# Patient Record
Sex: FEMALE | Race: WHITE | NOT HISPANIC OR LATINO | Employment: FULL TIME | URBAN - NONMETROPOLITAN AREA
[De-identification: names, ages, dates, MRNs, and addresses within clinical notes are randomized per-mention and may not be internally consistent; named-entity substitution may affect disease eponyms.]

---

## 2021-07-08 ENCOUNTER — APPOINTMENT (OUTPATIENT)
Dept: GENERAL RADIOLOGY | Facility: HOSPITAL | Age: 46
End: 2021-07-08

## 2021-07-08 ENCOUNTER — HOSPITAL ENCOUNTER (EMERGENCY)
Facility: HOSPITAL | Age: 46
Discharge: HOME | End: 2021-07-08
Admitting: EMERGENCY MEDICINE

## 2021-07-08 VITALS
HEIGHT: 64 IN | HEART RATE: 86 BPM | DIASTOLIC BLOOD PRESSURE: 88 MMHG | TEMPERATURE: 99.3 F | OXYGEN SATURATION: 96 % | SYSTOLIC BLOOD PRESSURE: 134 MMHG | BODY MASS INDEX: 47.12 KG/M2 | RESPIRATION RATE: 16 BRPM | WEIGHT: 276 LBS

## 2021-07-08 DIAGNOSIS — R74.8 ELEVATED LIVER ENZYMES: ICD-10-CM

## 2021-07-08 DIAGNOSIS — N39.0 URINARY TRACT INFECTION WITHOUT HEMATURIA, SITE UNSPECIFIED: ICD-10-CM

## 2021-07-08 DIAGNOSIS — U07.1 COVID-19: Primary | ICD-10-CM

## 2021-07-08 LAB
ALBUMIN SERPL-MCNC: 3.5 G/DL (ref 3.5–5.2)
ALBUMIN/GLOB SERPL: 1 G/DL
ALP SERPL-CCNC: 108 U/L (ref 39–117)
ALT SERPL W P-5'-P-CCNC: 161 U/L (ref 1–33)
ANION GAP SERPL CALCULATED.3IONS-SCNC: 11 MMOL/L (ref 5–15)
ANISOCYTOSIS BLD QL: ABNORMAL
AST SERPL-CCNC: 122 U/L (ref 1–32)
B-HCG UR QL: NEGATIVE
BACTERIA UR QL AUTO: ABNORMAL /HPF
BASOPHILS # BLD MANUAL: 0.03 10*3/MM3 (ref 0–0.2)
BASOPHILS NFR BLD AUTO: 1 % (ref 0–1.5)
BILIRUB SERPL-MCNC: 0.9 MG/DL (ref 0–1.2)
BILIRUB UR QL STRIP: NEGATIVE
BUN SERPL-MCNC: 7 MG/DL (ref 6–20)
BUN/CREAT SERPL: 7.5 (ref 7–25)
CALCIUM SPEC-SCNC: 8.3 MG/DL (ref 8.6–10.5)
CHLORIDE SERPL-SCNC: 92 MMOL/L (ref 98–107)
CLARITY UR: CLEAR
CO2 SERPL-SCNC: 27 MMOL/L (ref 22–29)
COLOR UR: ABNORMAL
CREAT SERPL-MCNC: 0.93 MG/DL (ref 0.57–1)
DEPRECATED RDW RBC AUTO: 40.6 FL (ref 37–54)
ERYTHROCYTE [DISTWIDTH] IN BLOOD BY AUTOMATED COUNT: 13.2 % (ref 12.3–15.4)
GFR SERPL CREATININE-BSD FRML MDRD: 65 ML/MIN/1.73
GIANT PLATELETS: ABNORMAL
GLOBULIN UR ELPH-MCNC: 3.6 GM/DL
GLUCOSE SERPL-MCNC: 115 MG/DL (ref 65–99)
GLUCOSE UR STRIP-MCNC: NEGATIVE MG/DL
HCT VFR BLD AUTO: 41.1 % (ref 34–46.6)
HGB BLD-MCNC: 14.9 G/DL (ref 12–15.9)
HGB UR QL STRIP.AUTO: NEGATIVE
HYALINE CASTS UR QL AUTO: ABNORMAL /LPF
KETONES UR QL STRIP: ABNORMAL
LEUKOCYTE ESTERASE UR QL STRIP.AUTO: ABNORMAL
LYMPHOCYTES # BLD MANUAL: 0.45 10*3/MM3 (ref 0.7–3.1)
LYMPHOCYTES NFR BLD MANUAL: 12.2 % (ref 19.6–45.3)
LYMPHOCYTES NFR BLD MANUAL: 2 % (ref 5–12)
MCH RBC QN AUTO: 30.5 PG (ref 26.6–33)
MCHC RBC AUTO-ENTMCNC: 36.3 G/DL (ref 31.5–35.7)
MCV RBC AUTO: 84 FL (ref 79–97)
MICROCYTES BLD QL: ABNORMAL
MONOCYTES # BLD AUTO: 0.06 10*3/MM3 (ref 0.1–0.9)
NEUTROPHILS # BLD AUTO: 2.39 10*3/MM3 (ref 1.7–7)
NEUTROPHILS NFR BLD MANUAL: 80.6 % (ref 42.7–76)
NEUTS BAND NFR BLD MANUAL: 1 % (ref 0–5)
NITRITE UR QL STRIP: NEGATIVE
PH UR STRIP.AUTO: 6.5 [PH] (ref 5–8)
PLATELET # BLD AUTO: 135 10*3/MM3 (ref 140–450)
PMV BLD AUTO: 9.6 FL (ref 6–12)
POTASSIUM SERPL-SCNC: 3.2 MMOL/L (ref 3.5–5.2)
PROT SERPL-MCNC: 7.1 G/DL (ref 6–8.5)
PROT UR QL STRIP: ABNORMAL
RBC # BLD AUTO: 4.89 10*6/MM3 (ref 3.77–5.28)
RBC # UR: ABNORMAL /HPF
REF LAB TEST METHOD: ABNORMAL
SMALL PLATELETS BLD QL SMEAR: ABNORMAL
SODIUM SERPL-SCNC: 130 MMOL/L (ref 136–145)
SP GR UR STRIP: 1.02 (ref 1–1.03)
SQUAMOUS #/AREA URNS HPF: ABNORMAL /HPF
UROBILINOGEN UR QL STRIP: ABNORMAL
VARIANT LYMPHS NFR BLD MANUAL: 3.1 % (ref 0–5)
WBC # BLD AUTO: 2.93 10*3/MM3 (ref 3.4–10.8)
WBC MORPH BLD: NORMAL
WBC UR QL AUTO: ABNORMAL /HPF

## 2021-07-08 PROCEDURE — 81001 URINALYSIS AUTO W/SCOPE: CPT

## 2021-07-08 PROCEDURE — 80053 COMPREHEN METABOLIC PANEL: CPT | Performed by: NURSE PRACTITIONER

## 2021-07-08 PROCEDURE — 85025 COMPLETE CBC W/AUTO DIFF WBC: CPT | Performed by: NURSE PRACTITIONER

## 2021-07-08 PROCEDURE — 71045 X-RAY EXAM CHEST 1 VIEW: CPT

## 2021-07-08 PROCEDURE — 81025 URINE PREGNANCY TEST: CPT | Performed by: NURSE PRACTITIONER

## 2021-07-08 PROCEDURE — 85007 BL SMEAR W/DIFF WBC COUNT: CPT | Performed by: NURSE PRACTITIONER

## 2021-07-08 PROCEDURE — 36415 COLL VENOUS BLD VENIPUNCTURE: CPT

## 2021-07-08 PROCEDURE — 99283 EMERGENCY DEPT VISIT LOW MDM: CPT

## 2021-07-08 RX ORDER — CEFDINIR 300 MG/1
300 CAPSULE ORAL 2 TIMES DAILY
Qty: 20 CAPSULE | Refills: 0 | Status: SHIPPED | OUTPATIENT
Start: 2021-07-08 | End: 2021-07-18

## 2021-07-08 RX ORDER — POTASSIUM CHLORIDE 1.5 G/1.77G
10 POWDER, FOR SOLUTION ORAL DAILY
COMMUNITY

## 2021-07-08 RX ORDER — HYDROCHLOROTHIAZIDE 25 MG/1
25 TABLET ORAL DAILY
COMMUNITY

## 2021-07-08 RX ORDER — ALBUTEROL SULFATE 0.63 MG/3ML
1 SOLUTION RESPIRATORY (INHALATION) EVERY 6 HOURS PRN
Qty: 25 EACH | Refills: 0 | Status: SHIPPED | OUTPATIENT
Start: 2021-07-08 | End: 2021-07-08 | Stop reason: ALTCHOICE

## 2021-07-08 RX ORDER — ALBUTEROL SULFATE 0.63 MG/3ML
1 SOLUTION RESPIRATORY (INHALATION) EVERY 6 HOURS PRN
Qty: 25 EACH | Refills: 0 | Status: SHIPPED | OUTPATIENT
Start: 2021-07-08

## 2021-07-08 RX ORDER — CEFDINIR 300 MG/1
300 CAPSULE ORAL 2 TIMES DAILY
Qty: 20 CAPSULE | Refills: 0 | Status: SHIPPED | OUTPATIENT
Start: 2021-07-08 | End: 2021-07-08 | Stop reason: ALTCHOICE

## 2021-07-12 ENCOUNTER — TELEPHONE (OUTPATIENT)
Dept: FAMILY MEDICINE CLINIC | Facility: CLINIC | Age: 46
End: 2021-07-12

## 2021-12-20 ENCOUNTER — TRANSCRIBE ORDERS (OUTPATIENT)
Dept: ADMINISTRATIVE | Facility: HOSPITAL | Age: 46
End: 2021-12-20

## 2021-12-20 DIAGNOSIS — Z12.31 ENCOUNTER FOR SCREENING MAMMOGRAM FOR MALIGNANT NEOPLASM OF BREAST: Primary | ICD-10-CM

## 2022-01-10 ENCOUNTER — HOSPITAL ENCOUNTER (OUTPATIENT)
Dept: MAMMOGRAPHY | Facility: HOSPITAL | Age: 47
Discharge: HOME | End: 2022-01-10
Admitting: NURSE PRACTITIONER

## 2022-01-10 DIAGNOSIS — Z12.31 ENCOUNTER FOR SCREENING MAMMOGRAM FOR MALIGNANT NEOPLASM OF BREAST: ICD-10-CM

## 2022-01-10 PROCEDURE — 77063 BREAST TOMOSYNTHESIS BI: CPT

## 2022-01-10 PROCEDURE — 77067 SCR MAMMO BI INCL CAD: CPT

## 2023-05-30 ENCOUNTER — TRANSCRIBE ORDERS (OUTPATIENT)
Dept: ADMINISTRATIVE | Facility: HOSPITAL | Age: 48
End: 2023-05-30

## 2023-05-30 DIAGNOSIS — Z12.31 ENCOUNTER FOR SCREENING MAMMOGRAM FOR MALIGNANT NEOPLASM OF BREAST: Primary | ICD-10-CM

## 2023-06-15 ENCOUNTER — HOSPITAL ENCOUNTER (OUTPATIENT)
Dept: MAMMOGRAPHY | Facility: HOSPITAL | Age: 48
Discharge: HOME | End: 2023-06-15
Admitting: NURSE PRACTITIONER
Payer: COMMERCIAL

## 2023-06-15 DIAGNOSIS — Z12.31 ENCOUNTER FOR SCREENING MAMMOGRAM FOR MALIGNANT NEOPLASM OF BREAST: ICD-10-CM

## 2023-06-15 PROCEDURE — 77067 SCR MAMMO BI INCL CAD: CPT

## 2023-06-15 PROCEDURE — 77063 BREAST TOMOSYNTHESIS BI: CPT

## 2023-08-28 ENCOUNTER — HOSPITAL ENCOUNTER (OUTPATIENT)
Dept: GENERAL RADIOLOGY | Facility: HOSPITAL | Age: 48
Discharge: HOME | End: 2023-08-28
Admitting: NURSE PRACTITIONER
Payer: COMMERCIAL

## 2023-08-28 PROCEDURE — 73562 X-RAY EXAM OF KNEE 3: CPT

## 2023-09-26 ENCOUNTER — TRANSCRIBE ORDERS (OUTPATIENT)
Dept: ADMINISTRATIVE | Facility: HOSPITAL | Age: 48
End: 2023-09-26
Payer: COMMERCIAL

## 2023-09-26 DIAGNOSIS — M25.462 EFFUSION OF LEFT KNEE: ICD-10-CM

## 2023-09-26 DIAGNOSIS — S80.02XD CONTUSION OF LEFT KNEE, SUBSEQUENT ENCOUNTER: Primary | ICD-10-CM

## 2023-10-12 ENCOUNTER — HOSPITAL ENCOUNTER (OUTPATIENT)
Dept: MRI IMAGING | Facility: HOSPITAL | Age: 48
Discharge: HOME | End: 2023-10-12
Admitting: FAMILY MEDICINE
Payer: OTHER MISCELLANEOUS

## 2023-10-12 DIAGNOSIS — S80.02XD CONTUSION OF LEFT KNEE, SUBSEQUENT ENCOUNTER: ICD-10-CM

## 2023-10-12 DIAGNOSIS — M25.462 EFFUSION OF LEFT KNEE: ICD-10-CM

## 2023-10-12 PROCEDURE — 73721 MRI JNT OF LWR EXTRE W/O DYE: CPT

## 2025-01-28 ENCOUNTER — APPOINTMENT (OUTPATIENT)
Dept: GENERAL RADIOLOGY | Facility: HOSPITAL | Age: 50
End: 2025-01-28
Payer: COMMERCIAL

## 2025-01-28 PROCEDURE — 73610 X-RAY EXAM OF ANKLE: CPT

## 2025-01-28 PROCEDURE — 73660 X-RAY EXAM OF TOE(S): CPT

## 2025-02-26 ENCOUNTER — APPOINTMENT (OUTPATIENT)
Dept: CT IMAGING | Facility: HOSPITAL | Age: 50
End: 2025-02-26
Payer: COMMERCIAL

## 2025-02-26 ENCOUNTER — APPOINTMENT (OUTPATIENT)
Dept: ULTRASOUND IMAGING | Facility: HOSPITAL | Age: 50
End: 2025-02-26
Payer: COMMERCIAL

## 2025-02-26 ENCOUNTER — HOSPITAL ENCOUNTER (INPATIENT)
Facility: HOSPITAL | Age: 50
LOS: 2 days | Discharge: HOME | End: 2025-02-28
Attending: INTERNAL MEDICINE | Admitting: INTERNAL MEDICINE
Payer: COMMERCIAL

## 2025-02-26 DIAGNOSIS — I82.4Y2 ACUTE DEEP VEIN THROMBOSIS (DVT) OF PROXIMAL VEIN OF LEFT LOWER EXTREMITY: Primary | ICD-10-CM

## 2025-02-26 DIAGNOSIS — G89.18 POSTOPERATIVE PAIN: ICD-10-CM

## 2025-02-26 PROBLEM — I82.409 DVT (DEEP VENOUS THROMBOSIS): Status: ACTIVE | Noted: 2025-02-26

## 2025-02-26 LAB
ALBUMIN SERPL-MCNC: 3.8 G/DL (ref 3.5–5.2)
ALBUMIN/GLOB SERPL: 1.1 G/DL
ALP SERPL-CCNC: 92 U/L (ref 39–117)
ALT SERPL W P-5'-P-CCNC: 20 U/L (ref 1–33)
ANION GAP SERPL CALCULATED.3IONS-SCNC: 16 MMOL/L (ref 5–15)
AST SERPL-CCNC: 20 U/L (ref 1–32)
B-HCG UR QL: NEGATIVE
BASOPHILS # BLD AUTO: 0.04 10*3/MM3 (ref 0–0.2)
BASOPHILS NFR BLD AUTO: 0.3 % (ref 0–1.5)
BILIRUB SERPL-MCNC: 0.9 MG/DL (ref 0–1.2)
BUN SERPL-MCNC: 8 MG/DL (ref 6–20)
BUN/CREAT SERPL: 11 (ref 7–25)
CALCIUM SPEC-SCNC: 8.8 MG/DL (ref 8.6–10.5)
CHLORIDE SERPL-SCNC: 94 MMOL/L (ref 98–107)
CO2 SERPL-SCNC: 22 MMOL/L (ref 22–29)
CREAT SERPL-MCNC: 0.73 MG/DL (ref 0.57–1)
DEPRECATED RDW RBC AUTO: 46 FL (ref 37–54)
EGFRCR SERPLBLD CKD-EPI 2021: 100.3 ML/MIN/1.73
EOSINOPHIL # BLD AUTO: 0.13 10*3/MM3 (ref 0–0.4)
EOSINOPHIL NFR BLD AUTO: 1 % (ref 0.3–6.2)
ERYTHROCYTE [DISTWIDTH] IN BLOOD BY AUTOMATED COUNT: 14.4 % (ref 12.3–15.4)
EXPIRATION DATE: NORMAL
GLOBULIN UR ELPH-MCNC: 3.6 GM/DL
GLUCOSE SERPL-MCNC: 104 MG/DL (ref 65–99)
HCT VFR BLD AUTO: 37.6 % (ref 34–46.6)
HGB BLD-MCNC: 13.6 G/DL (ref 12–15.9)
IMM GRANULOCYTES # BLD AUTO: 0.04 10*3/MM3 (ref 0–0.05)
IMM GRANULOCYTES NFR BLD AUTO: 0.3 % (ref 0–0.5)
INR PPP: 0.99 (ref 0.91–1.09)
INTERNAL NEGATIVE CONTROL: NEGATIVE
INTERNAL POSITIVE CONTROL: POSITIVE
LYMPHOCYTES # BLD AUTO: 1.55 10*3/MM3 (ref 0.7–3.1)
LYMPHOCYTES NFR BLD AUTO: 12.3 % (ref 19.6–45.3)
Lab: NORMAL
MAGNESIUM SERPL-MCNC: 1.9 MG/DL (ref 1.6–2.6)
MCH RBC QN AUTO: 31.4 PG (ref 26.6–33)
MCHC RBC AUTO-ENTMCNC: 36.2 G/DL (ref 31.5–35.7)
MCV RBC AUTO: 86.8 FL (ref 79–97)
MONOCYTES # BLD AUTO: 0.54 10*3/MM3 (ref 0.1–0.9)
MONOCYTES NFR BLD AUTO: 4.3 % (ref 5–12)
NEUTROPHILS NFR BLD AUTO: 10.29 10*3/MM3 (ref 1.7–7)
NEUTROPHILS NFR BLD AUTO: 81.8 % (ref 42.7–76)
NRBC BLD AUTO-RTO: 0 /100 WBC (ref 0–0.2)
PLATELET # BLD AUTO: 263 10*3/MM3 (ref 140–450)
PMV BLD AUTO: 9.3 FL (ref 6–12)
POTASSIUM SERPL-SCNC: 3 MMOL/L (ref 3.5–5.2)
PROT SERPL-MCNC: 7.4 G/DL (ref 6–8.5)
PROTHROMBIN TIME: 13.6 SECONDS (ref 11.8–14.8)
RBC # BLD AUTO: 4.33 10*6/MM3 (ref 3.77–5.28)
SODIUM SERPL-SCNC: 132 MMOL/L (ref 136–145)
WBC NRBC COR # BLD AUTO: 12.59 10*3/MM3 (ref 3.4–10.8)

## 2025-02-26 PROCEDURE — 71275 CT ANGIOGRAPHY CHEST: CPT

## 2025-02-26 PROCEDURE — 93971 EXTREMITY STUDY: CPT

## 2025-02-26 PROCEDURE — 81025 URINE PREGNANCY TEST: CPT | Performed by: NURSE PRACTITIONER

## 2025-02-26 PROCEDURE — 83735 ASSAY OF MAGNESIUM: CPT | Performed by: NURSE PRACTITIONER

## 2025-02-26 PROCEDURE — 85610 PROTHROMBIN TIME: CPT | Performed by: NURSE PRACTITIONER

## 2025-02-26 PROCEDURE — 80053 COMPREHEN METABOLIC PANEL: CPT | Performed by: NURSE PRACTITIONER

## 2025-02-26 PROCEDURE — 25510000001 IOPAMIDOL PER 1 ML: Performed by: INTERNAL MEDICINE

## 2025-02-26 PROCEDURE — 85025 COMPLETE CBC W/AUTO DIFF WBC: CPT | Performed by: NURSE PRACTITIONER

## 2025-02-26 PROCEDURE — 74174 CTA ABD&PLVS W/CONTRAST: CPT

## 2025-02-26 PROCEDURE — 99285 EMERGENCY DEPT VISIT HI MDM: CPT

## 2025-02-26 PROCEDURE — 25010000002 ENOXAPARIN PER 10 MG: Performed by: NURSE PRACTITIONER

## 2025-02-26 PROCEDURE — 93971 EXTREMITY STUDY: CPT | Performed by: STUDENT IN AN ORGANIZED HEALTH CARE EDUCATION/TRAINING PROGRAM

## 2025-02-26 RX ORDER — CETIRIZINE HYDROCHLORIDE 10 MG/1
5 TABLET ORAL DAILY
Status: DISCONTINUED | OUTPATIENT
Start: 2025-02-27 | End: 2025-03-01 | Stop reason: HOSPADM

## 2025-02-26 RX ORDER — NALOXONE HCL 0.4 MG/ML
0.4 VIAL (ML) INJECTION
Status: DISCONTINUED | OUTPATIENT
Start: 2025-02-26 | End: 2025-02-27 | Stop reason: ALTCHOICE

## 2025-02-26 RX ORDER — SODIUM CHLORIDE 0.9 % (FLUSH) 0.9 %
10 SYRINGE (ML) INJECTION AS NEEDED
Status: DISCONTINUED | OUTPATIENT
Start: 2025-02-26 | End: 2025-03-01 | Stop reason: HOSPADM

## 2025-02-26 RX ORDER — IOPAMIDOL 755 MG/ML
100 INJECTION, SOLUTION INTRAVASCULAR
Status: COMPLETED | OUTPATIENT
Start: 2025-02-26 | End: 2025-02-26

## 2025-02-26 RX ORDER — ONDANSETRON 2 MG/ML
4 INJECTION INTRAMUSCULAR; INTRAVENOUS EVERY 6 HOURS PRN
Status: DISCONTINUED | OUTPATIENT
Start: 2025-02-26 | End: 2025-02-27 | Stop reason: SDUPTHER

## 2025-02-26 RX ORDER — SODIUM CHLORIDE 0.9 % (FLUSH) 0.9 %
10 SYRINGE (ML) INJECTION EVERY 12 HOURS SCHEDULED
Status: DISCONTINUED | OUTPATIENT
Start: 2025-02-26 | End: 2025-03-01 | Stop reason: HOSPADM

## 2025-02-26 RX ORDER — POTASSIUM CHLORIDE 1.5 G/1.58G
10 POWDER, FOR SOLUTION ORAL 2 TIMES DAILY
Status: DISCONTINUED | OUTPATIENT
Start: 2025-02-26 | End: 2025-03-01 | Stop reason: HOSPADM

## 2025-02-26 RX ORDER — MORPHINE SULFATE 2 MG/ML
1 INJECTION, SOLUTION INTRAMUSCULAR; INTRAVENOUS EVERY 4 HOURS PRN
Status: DISCONTINUED | OUTPATIENT
Start: 2025-02-26 | End: 2025-02-27 | Stop reason: ALTCHOICE

## 2025-02-26 RX ORDER — BISACODYL 10 MG
10 SUPPOSITORY, RECTAL RECTAL DAILY PRN
Status: DISCONTINUED | OUTPATIENT
Start: 2025-02-26 | End: 2025-03-01 | Stop reason: HOSPADM

## 2025-02-26 RX ORDER — AMOXICILLIN 250 MG
2 CAPSULE ORAL 2 TIMES DAILY PRN
Status: DISCONTINUED | OUTPATIENT
Start: 2025-02-26 | End: 2025-03-01 | Stop reason: HOSPADM

## 2025-02-26 RX ORDER — HYDROCHLOROTHIAZIDE 25 MG/1
25 TABLET ORAL DAILY
Status: DISCONTINUED | OUTPATIENT
Start: 2025-02-27 | End: 2025-03-01 | Stop reason: HOSPADM

## 2025-02-26 RX ORDER — THYROID 60 MG/1
60 TABLET ORAL
Status: DISCONTINUED | OUTPATIENT
Start: 2025-02-27 | End: 2025-02-26

## 2025-02-26 RX ORDER — ACETAMINOPHEN 160 MG/5ML
650 SOLUTION ORAL EVERY 4 HOURS PRN
Status: DISCONTINUED | OUTPATIENT
Start: 2025-02-26 | End: 2025-03-01 | Stop reason: HOSPADM

## 2025-02-26 RX ORDER — BISACODYL 5 MG/1
5 TABLET, DELAYED RELEASE ORAL DAILY PRN
Status: DISCONTINUED | OUTPATIENT
Start: 2025-02-26 | End: 2025-03-01 | Stop reason: HOSPADM

## 2025-02-26 RX ORDER — POTASSIUM CHLORIDE 1500 MG/1
40 TABLET, EXTENDED RELEASE ORAL ONCE
Status: COMPLETED | OUTPATIENT
Start: 2025-02-26 | End: 2025-02-26

## 2025-02-26 RX ORDER — ENOXAPARIN SODIUM 150 MG/ML
1 INJECTION SUBCUTANEOUS EVERY 12 HOURS
Status: DISCONTINUED | OUTPATIENT
Start: 2025-02-27 | End: 2025-02-28

## 2025-02-26 RX ORDER — ACETAMINOPHEN 650 MG/1
650 SUPPOSITORY RECTAL EVERY 4 HOURS PRN
Status: DISCONTINUED | OUTPATIENT
Start: 2025-02-26 | End: 2025-03-01 | Stop reason: HOSPADM

## 2025-02-26 RX ORDER — LOSARTAN POTASSIUM 50 MG/1
50 TABLET ORAL
Status: DISCONTINUED | OUTPATIENT
Start: 2025-02-27 | End: 2025-03-01 | Stop reason: HOSPADM

## 2025-02-26 RX ORDER — HYDROCODONE BITARTRATE AND ACETAMINOPHEN 5; 325 MG/1; MG/1
1 TABLET ORAL EVERY 6 HOURS PRN
Status: DISCONTINUED | OUTPATIENT
Start: 2025-02-26 | End: 2025-03-01 | Stop reason: HOSPADM

## 2025-02-26 RX ORDER — POLYETHYLENE GLYCOL 3350 17 G/17G
17 POWDER, FOR SOLUTION ORAL DAILY PRN
Status: DISCONTINUED | OUTPATIENT
Start: 2025-02-26 | End: 2025-03-01 | Stop reason: HOSPADM

## 2025-02-26 RX ORDER — SODIUM CHLORIDE 9 MG/ML
40 INJECTION, SOLUTION INTRAVENOUS AS NEEDED
Status: DISCONTINUED | OUTPATIENT
Start: 2025-02-26 | End: 2025-03-01 | Stop reason: HOSPADM

## 2025-02-26 RX ORDER — ENOXAPARIN SODIUM 150 MG/ML
1 INJECTION SUBCUTANEOUS ONCE
Status: COMPLETED | OUTPATIENT
Start: 2025-02-26 | End: 2025-02-26

## 2025-02-26 RX ORDER — ACETAMINOPHEN 325 MG/1
650 TABLET ORAL EVERY 4 HOURS PRN
Status: DISCONTINUED | OUTPATIENT
Start: 2025-02-26 | End: 2025-03-01 | Stop reason: HOSPADM

## 2025-02-26 RX ADMIN — POTASSIUM CHLORIDE 40 MEQ: 1500 TABLET, EXTENDED RELEASE ORAL at 19:50

## 2025-02-26 RX ADMIN — IOPAMIDOL 100 ML: 755 INJECTION, SOLUTION INTRAVENOUS at 20:34

## 2025-02-26 RX ADMIN — Medication 10 ML: at 22:16

## 2025-02-26 RX ADMIN — POTASSIUM CHLORIDE 10 MEQ: 1.5 POWDER, FOR SOLUTION ORAL at 22:14

## 2025-02-26 RX ADMIN — ENOXAPARIN SODIUM 120 MG: 120 INJECTION SUBCUTANEOUS at 19:50

## 2025-02-26 RX ADMIN — HYDROCODONE BITARTRATE AND ACETAMINOPHEN 1 TABLET: 5; 325 TABLET ORAL at 22:15

## 2025-02-27 ENCOUNTER — APPOINTMENT (OUTPATIENT)
Dept: INTERVENTIONAL RADIOLOGY/VASCULAR | Facility: HOSPITAL | Age: 50
End: 2025-02-27
Payer: COMMERCIAL

## 2025-02-27 ENCOUNTER — APPOINTMENT (OUTPATIENT)
Dept: ULTRASOUND IMAGING | Facility: HOSPITAL | Age: 50
End: 2025-02-27
Payer: COMMERCIAL

## 2025-02-27 ENCOUNTER — ANESTHESIA EVENT (OUTPATIENT)
Dept: PERIOP | Facility: HOSPITAL | Age: 50
End: 2025-02-27
Payer: COMMERCIAL

## 2025-02-27 ENCOUNTER — ANESTHESIA (OUTPATIENT)
Dept: PERIOP | Facility: HOSPITAL | Age: 50
End: 2025-02-27
Payer: COMMERCIAL

## 2025-02-27 PROBLEM — I82.422 ACUTE DEEP VEIN THROMBOSIS (DVT) OF LEFT ILIOFEMORAL VEIN: Status: ACTIVE | Noted: 2025-02-27

## 2025-02-27 PROBLEM — I82.409 DVT (DEEP VENOUS THROMBOSIS): Status: RESOLVED | Noted: 2025-02-26 | Resolved: 2025-02-27

## 2025-02-27 LAB
ABO GROUP BLD: NORMAL
ANION GAP SERPL CALCULATED.3IONS-SCNC: 14 MMOL/L (ref 5–15)
BLD GP AB SCN SERPL QL: NEGATIVE
BUN SERPL-MCNC: 7 MG/DL (ref 6–20)
BUN/CREAT SERPL: 10.8 (ref 7–25)
CALCIUM SPEC-SCNC: 8.5 MG/DL (ref 8.6–10.5)
CHLORIDE SERPL-SCNC: 97 MMOL/L (ref 98–107)
CO2 SERPL-SCNC: 23 MMOL/L (ref 22–29)
CREAT SERPL-MCNC: 0.65 MG/DL (ref 0.57–1)
EGFRCR SERPLBLD CKD-EPI 2021: 107.4 ML/MIN/1.73
GLUCOSE SERPL-MCNC: 105 MG/DL (ref 65–99)
MAGNESIUM SERPL-MCNC: 2.1 MG/DL (ref 1.6–2.6)
POTASSIUM SERPL-SCNC: 3 MMOL/L (ref 3.5–5.2)
POTASSIUM SERPL-SCNC: 3.6 MMOL/L (ref 3.5–5.2)
RH BLD: NEGATIVE
SODIUM SERPL-SCNC: 134 MMOL/L (ref 136–145)
T&S EXPIRATION DATE: NORMAL
TSH SERPL DL<=0.05 MIU/L-ACNC: 2.72 UIU/ML (ref 0.27–4.2)

## 2025-02-27 PROCEDURE — C1725 CATH, TRANSLUMIN NON-LASER: HCPCS | Performed by: SURGERY

## 2025-02-27 PROCEDURE — 25010000002 FENTANYL CITRATE (PF) 50 MCG/ML SOLUTION: Performed by: ANESTHESIOLOGY

## 2025-02-27 PROCEDURE — 83735 ASSAY OF MAGNESIUM: CPT | Performed by: INTERNAL MEDICINE

## 2025-02-27 PROCEDURE — 25010000002 MIDAZOLAM PER 1MG: Performed by: ANESTHESIOLOGY

## 2025-02-27 PROCEDURE — 86901 BLOOD TYPING SEROLOGIC RH(D): CPT | Performed by: SURGERY

## 2025-02-27 PROCEDURE — 25010000002 PROPOFOL 10 MG/ML EMULSION

## 2025-02-27 PROCEDURE — 25810000003 LACTATED RINGERS PER 1000 ML: Performed by: ANESTHESIOLOGY

## 2025-02-27 PROCEDURE — 04CJ3ZZ EXTIRPATION OF MATTER FROM LEFT EXTERNAL ILIAC ARTERY, PERCUTANEOUS APPROACH: ICD-10-PCS | Performed by: SURGERY

## 2025-02-27 PROCEDURE — 99222 1ST HOSP IP/OBS MODERATE 55: CPT | Performed by: SURGERY

## 2025-02-27 PROCEDURE — 25010000002 ONDANSETRON PER 1 MG

## 2025-02-27 PROCEDURE — 76000 FLUOROSCOPY <1 HR PHYS/QHP: CPT

## 2025-02-27 PROCEDURE — 25510000001 IOPAMIDOL 61 % SOLUTION: Performed by: SURGERY

## 2025-02-27 PROCEDURE — 06CN3ZZ EXTIRPATION OF MATTER FROM LEFT FEMORAL VEIN, PERCUTANEOUS APPROACH: ICD-10-PCS | Performed by: SURGERY

## 2025-02-27 PROCEDURE — C1887 CATHETER, GUIDING: HCPCS | Performed by: SURGERY

## 2025-02-27 PROCEDURE — C1889 IMPLANT/INSERT DEVICE, NOC: HCPCS | Performed by: SURGERY

## 2025-02-27 PROCEDURE — C1757 CATH, THROMBECTOMY/EMBOLECT: HCPCS | Performed by: SURGERY

## 2025-02-27 PROCEDURE — B51C1ZZ FLUOROSCOPY OF LEFT LOWER EXTREMITY VEINS USING LOW OSMOLAR CONTRAST: ICD-10-PCS | Performed by: SURGERY

## 2025-02-27 PROCEDURE — 80048 BASIC METABOLIC PNL TOTAL CA: CPT | Performed by: INTERNAL MEDICINE

## 2025-02-27 PROCEDURE — 25010000002 ENOXAPARIN PER 10 MG: Performed by: SURGERY

## 2025-02-27 PROCEDURE — 75820 VEIN X-RAY ARM/LEG: CPT | Performed by: SURGERY

## 2025-02-27 PROCEDURE — 84443 ASSAY THYROID STIM HORMONE: CPT | Performed by: INTERNAL MEDICINE

## 2025-02-27 PROCEDURE — 25010000002 DEXAMETHASONE PER 1 MG: Performed by: ANESTHESIOLOGY

## 2025-02-27 PROCEDURE — 25010000002 ONDANSETRON PER 1 MG: Performed by: INTERNAL MEDICINE

## 2025-02-27 PROCEDURE — 25010000002 LIDOCAINE PF 2% 2 % SOLUTION

## 2025-02-27 PROCEDURE — 76937 US GUIDE VASCULAR ACCESS: CPT

## 2025-02-27 PROCEDURE — 06LN3DZ OCCLUSION OF LEFT FEMORAL VEIN WITH INTRALUMINAL DEVICE, PERCUTANEOUS APPROACH: ICD-10-PCS | Performed by: SURGERY

## 2025-02-27 PROCEDURE — 84132 ASSAY OF SERUM POTASSIUM: CPT | Performed by: SURGERY

## 2025-02-27 PROCEDURE — C1894 INTRO/SHEATH, NON-LASER: HCPCS | Performed by: SURGERY

## 2025-02-27 PROCEDURE — 86900 BLOOD TYPING SEROLOGIC ABO: CPT | Performed by: SURGERY

## 2025-02-27 PROCEDURE — 86850 RBC ANTIBODY SCREEN: CPT | Performed by: SURGERY

## 2025-02-27 PROCEDURE — C1769 GUIDE WIRE: HCPCS | Performed by: SURGERY

## 2025-02-27 PROCEDURE — 25010000002 FENTANYL CITRATE (PF) 100 MCG/2ML SOLUTION

## 2025-02-27 PROCEDURE — L1830 KO IMMOB CANVAS LONG PRE OTS: HCPCS | Performed by: SURGERY

## 2025-02-27 PROCEDURE — 25010000002 CEFAZOLIN PER 500 MG: Performed by: SURGERY

## 2025-02-27 PROCEDURE — 37241 VASC EMBOLIZE/OCCLUDE VENOUS: CPT | Performed by: SURGERY

## 2025-02-27 PROCEDURE — 04CD3ZZ EXTIRPATION OF MATTER FROM LEFT COMMON ILIAC ARTERY, PERCUTANEOUS APPROACH: ICD-10-PCS | Performed by: SURGERY

## 2025-02-27 PROCEDURE — 25010000002 ENOXAPARIN PER 10 MG: Performed by: INTERNAL MEDICINE

## 2025-02-27 PROCEDURE — 25010000002 HEPARIN (PORCINE) PER 1000 UNITS: Performed by: SURGERY

## 2025-02-27 PROCEDURE — 25010000002 SUGAMMADEX 200 MG/2ML SOLUTION

## 2025-02-27 PROCEDURE — 75820 VEIN X-RAY ARM/LEG: CPT

## 2025-02-27 PROCEDURE — 37187 VENOUS MECH THROMBECTOMY: CPT | Performed by: SURGERY

## 2025-02-27 PROCEDURE — 25010000002 DEXAMETHASONE PER 1 MG

## 2025-02-27 PROCEDURE — 25010000002 ONDANSETRON PER 1 MG: Performed by: ANESTHESIOLOGY

## 2025-02-27 PROCEDURE — 76937 US GUIDE VASCULAR ACCESS: CPT | Performed by: SURGERY

## 2025-02-27 DEVICE — IMPLANTABLE DEVICE: Type: IMPLANTABLE DEVICE | Site: LEG | Status: FUNCTIONAL

## 2025-02-27 RX ORDER — ONDANSETRON 2 MG/ML
4 INJECTION INTRAMUSCULAR; INTRAVENOUS ONCE AS NEEDED
Status: COMPLETED | OUTPATIENT
Start: 2025-02-27 | End: 2025-02-27

## 2025-02-27 RX ORDER — PROPOFOL 10 MG/ML
VIAL (ML) INTRAVENOUS AS NEEDED
Status: DISCONTINUED | OUTPATIENT
Start: 2025-02-27 | End: 2025-02-27 | Stop reason: SURG

## 2025-02-27 RX ORDER — POTASSIUM CHLORIDE 1500 MG/1
40 TABLET, EXTENDED RELEASE ORAL EVERY 4 HOURS
Status: DISPENSED | OUTPATIENT
Start: 2025-02-27 | End: 2025-02-27

## 2025-02-27 RX ORDER — SODIUM CHLORIDE, SODIUM LACTATE, POTASSIUM CHLORIDE, CALCIUM CHLORIDE 600; 310; 30; 20 MG/100ML; MG/100ML; MG/100ML; MG/100ML
100 INJECTION, SOLUTION INTRAVENOUS CONTINUOUS
Status: DISCONTINUED | OUTPATIENT
Start: 2025-02-27 | End: 2025-02-27

## 2025-02-27 RX ORDER — ONDANSETRON 2 MG/ML
INJECTION INTRAMUSCULAR; INTRAVENOUS AS NEEDED
Status: DISCONTINUED | OUTPATIENT
Start: 2025-02-27 | End: 2025-02-27 | Stop reason: SURG

## 2025-02-27 RX ORDER — FENTANYL CITRATE 50 UG/ML
INJECTION, SOLUTION INTRAMUSCULAR; INTRAVENOUS AS NEEDED
Status: DISCONTINUED | OUTPATIENT
Start: 2025-02-27 | End: 2025-02-27 | Stop reason: SURG

## 2025-02-27 RX ORDER — SODIUM CHLORIDE 0.9 % (FLUSH) 0.9 %
3-10 SYRINGE (ML) INJECTION AS NEEDED
Status: DISCONTINUED | OUTPATIENT
Start: 2025-02-27 | End: 2025-02-27 | Stop reason: HOSPADM

## 2025-02-27 RX ORDER — HYDROCODONE BITARTRATE AND ACETAMINOPHEN 10; 325 MG/1; MG/1
1 TABLET ORAL EVERY 4 HOURS PRN
Status: DISCONTINUED | OUTPATIENT
Start: 2025-02-27 | End: 2025-02-27 | Stop reason: HOSPADM

## 2025-02-27 RX ORDER — ACETAMINOPHEN 325 MG/1
650 TABLET ORAL EVERY 8 HOURS
Status: DISCONTINUED | OUTPATIENT
Start: 2025-02-27 | End: 2025-03-01 | Stop reason: HOSPADM

## 2025-02-27 RX ORDER — ROCURONIUM BROMIDE 10 MG/ML
INJECTION, SOLUTION INTRAVENOUS AS NEEDED
Status: DISCONTINUED | OUTPATIENT
Start: 2025-02-27 | End: 2025-02-27 | Stop reason: SURG

## 2025-02-27 RX ORDER — NALOXONE HCL 0.4 MG/ML
0.4 VIAL (ML) INJECTION
Status: DISCONTINUED | OUTPATIENT
Start: 2025-02-27 | End: 2025-03-01 | Stop reason: HOSPADM

## 2025-02-27 RX ORDER — ONDANSETRON 4 MG/1
4 TABLET, ORALLY DISINTEGRATING ORAL EVERY 6 HOURS PRN
Status: DISCONTINUED | OUTPATIENT
Start: 2025-02-27 | End: 2025-03-01 | Stop reason: HOSPADM

## 2025-02-27 RX ORDER — DEXAMETHASONE SODIUM PHOSPHATE 4 MG/ML
4 INJECTION, SOLUTION INTRA-ARTICULAR; INTRALESIONAL; INTRAMUSCULAR; INTRAVENOUS; SOFT TISSUE ONCE AS NEEDED
Status: COMPLETED | OUTPATIENT
Start: 2025-02-27 | End: 2025-02-27

## 2025-02-27 RX ORDER — SODIUM CHLORIDE 0.9 % (FLUSH) 0.9 %
3 SYRINGE (ML) INJECTION EVERY 12 HOURS SCHEDULED
Status: DISCONTINUED | OUTPATIENT
Start: 2025-02-27 | End: 2025-02-27 | Stop reason: HOSPADM

## 2025-02-27 RX ORDER — ACETAMINOPHEN 650 MG/1
650 SUPPOSITORY RECTAL EVERY 8 HOURS
Status: DISCONTINUED | OUTPATIENT
Start: 2025-02-27 | End: 2025-03-01 | Stop reason: HOSPADM

## 2025-02-27 RX ORDER — ALBUTEROL SULFATE 90 UG/1
2 INHALANT RESPIRATORY (INHALATION) EVERY 4 HOURS PRN
COMMUNITY

## 2025-02-27 RX ORDER — HYDROCODONE BITARTRATE AND ACETAMINOPHEN 5; 325 MG/1; MG/1
1 TABLET ORAL EVERY 4 HOURS PRN
Status: DISCONTINUED | OUTPATIENT
Start: 2025-02-27 | End: 2025-02-27 | Stop reason: HOSPADM

## 2025-02-27 RX ORDER — LABETALOL HYDROCHLORIDE 5 MG/ML
5 INJECTION, SOLUTION INTRAVENOUS
Status: DISCONTINUED | OUTPATIENT
Start: 2025-02-27 | End: 2025-02-27 | Stop reason: HOSPADM

## 2025-02-27 RX ORDER — MIDAZOLAM HYDROCHLORIDE 2 MG/2ML
2 INJECTION, SOLUTION INTRAMUSCULAR; INTRAVENOUS
Status: DISCONTINUED | OUTPATIENT
Start: 2025-02-27 | End: 2025-02-27 | Stop reason: HOSPADM

## 2025-02-27 RX ORDER — POTASSIUM CHLORIDE 750 MG/1
10 CAPSULE, EXTENDED RELEASE ORAL 2 TIMES DAILY
COMMUNITY
End: 2025-02-28 | Stop reason: HOSPADM

## 2025-02-27 RX ORDER — IOPAMIDOL 612 MG/ML
INJECTION, SOLUTION INTRAVASCULAR AS NEEDED
Status: DISCONTINUED | OUTPATIENT
Start: 2025-02-27 | End: 2025-02-27 | Stop reason: HOSPADM

## 2025-02-27 RX ORDER — DEXAMETHASONE SODIUM PHOSPHATE 4 MG/ML
INJECTION, SOLUTION INTRA-ARTICULAR; INTRALESIONAL; INTRAMUSCULAR; INTRAVENOUS; SOFT TISSUE AS NEEDED
Status: DISCONTINUED | OUTPATIENT
Start: 2025-02-27 | End: 2025-02-27 | Stop reason: SURG

## 2025-02-27 RX ORDER — HYDROCODONE BITARTRATE AND ACETAMINOPHEN 5; 325 MG/1; MG/1
1 TABLET ORAL EVERY 6 HOURS PRN
Status: DISCONTINUED | OUTPATIENT
Start: 2025-02-27 | End: 2025-02-27 | Stop reason: SDUPTHER

## 2025-02-27 RX ORDER — BUPIVACAINE HCL/0.9 % NACL/PF 0.125 %
PLASTIC BAG, INJECTION (ML) EPIDURAL AS NEEDED
Status: DISCONTINUED | OUTPATIENT
Start: 2025-02-27 | End: 2025-02-27 | Stop reason: SURG

## 2025-02-27 RX ORDER — LIDOCAINE HYDROCHLORIDE 20 MG/ML
INJECTION, SOLUTION EPIDURAL; INFILTRATION; INTRACAUDAL; PERINEURAL AS NEEDED
Status: DISCONTINUED | OUTPATIENT
Start: 2025-02-27 | End: 2025-02-27 | Stop reason: SURG

## 2025-02-27 RX ORDER — ONDANSETRON 2 MG/ML
4 INJECTION INTRAMUSCULAR; INTRAVENOUS EVERY 6 HOURS PRN
Status: DISCONTINUED | OUTPATIENT
Start: 2025-02-27 | End: 2025-03-01 | Stop reason: HOSPADM

## 2025-02-27 RX ORDER — NALOXONE HCL 0.4 MG/ML
0.4 VIAL (ML) INJECTION AS NEEDED
Status: DISCONTINUED | OUTPATIENT
Start: 2025-02-27 | End: 2025-02-27 | Stop reason: HOSPADM

## 2025-02-27 RX ORDER — MORPHINE SULFATE 2 MG/ML
2 INJECTION, SOLUTION INTRAMUSCULAR; INTRAVENOUS
Status: DISCONTINUED | OUTPATIENT
Start: 2025-02-27 | End: 2025-03-01 | Stop reason: HOSPADM

## 2025-02-27 RX ORDER — SODIUM CHLORIDE 9 MG/ML
40 INJECTION, SOLUTION INTRAVENOUS AS NEEDED
Status: DISCONTINUED | OUTPATIENT
Start: 2025-02-27 | End: 2025-02-27 | Stop reason: HOSPADM

## 2025-02-27 RX ORDER — FENTANYL CITRATE 50 UG/ML
50 INJECTION, SOLUTION INTRAMUSCULAR; INTRAVENOUS
Status: DISCONTINUED | OUTPATIENT
Start: 2025-02-27 | End: 2025-02-27 | Stop reason: HOSPADM

## 2025-02-27 RX ORDER — FLUMAZENIL 0.1 MG/ML
0.2 INJECTION INTRAVENOUS AS NEEDED
Status: DISCONTINUED | OUTPATIENT
Start: 2025-02-27 | End: 2025-02-27 | Stop reason: HOSPADM

## 2025-02-27 RX ORDER — ACETAMINOPHEN 160 MG/5ML
650 SOLUTION ORAL EVERY 8 HOURS
Status: DISCONTINUED | OUTPATIENT
Start: 2025-02-27 | End: 2025-03-01 | Stop reason: HOSPADM

## 2025-02-27 RX ADMIN — HYDROCODONE BITARTRATE AND ACETAMINOPHEN 1 TABLET: 5; 325 TABLET ORAL at 05:48

## 2025-02-27 RX ADMIN — LIDOCAINE HYDROCHLORIDE 100 MG: 20 INJECTION, SOLUTION EPIDURAL; INFILTRATION; INTRACAUDAL; PERINEURAL at 11:07

## 2025-02-27 RX ADMIN — Medication 100 MCG: at 11:21

## 2025-02-27 RX ADMIN — Medication 10 ML: at 21:46

## 2025-02-27 RX ADMIN — ONDANSETRON 4 MG: 2 INJECTION INTRAMUSCULAR; INTRAVENOUS at 12:58

## 2025-02-27 RX ADMIN — ROCURONIUM 50 MG: 50 INJECTION, SOLUTION INTRAVENOUS at 11:07

## 2025-02-27 RX ADMIN — POTASSIUM CHLORIDE 10 MEQ: 1.5 POWDER, FOR SOLUTION ORAL at 08:15

## 2025-02-27 RX ADMIN — POTASSIUM CHLORIDE 40 MEQ: 1500 TABLET, EXTENDED RELEASE ORAL at 08:15

## 2025-02-27 RX ADMIN — LOSARTAN POTASSIUM 50 MG: 50 TABLET, FILM COATED ORAL at 08:15

## 2025-02-27 RX ADMIN — CETIRIZINE HYDROCHLORIDE 5 MG: 10 TABLET, FILM COATED ORAL at 08:15

## 2025-02-27 RX ADMIN — ACETAMINOPHEN 650 MG: 325 TABLET ORAL at 21:45

## 2025-02-27 RX ADMIN — ENOXAPARIN SODIUM 120 MG: 120 INJECTION SUBCUTANEOUS at 05:49

## 2025-02-27 RX ADMIN — Medication 100 MCG: at 11:30

## 2025-02-27 RX ADMIN — ONDANSETRON 4 MG: 2 INJECTION INTRAMUSCULAR; INTRAVENOUS at 12:55

## 2025-02-27 RX ADMIN — MIDAZOLAM HYDROCHLORIDE 2 MG: 1 INJECTION, SOLUTION INTRAMUSCULAR; INTRAVENOUS at 10:57

## 2025-02-27 RX ADMIN — HYDROCODONE BITARTRATE AND ACETAMINOPHEN 1 TABLET: 10; 325 TABLET ORAL at 13:12

## 2025-02-27 RX ADMIN — POTASSIUM CHLORIDE 10 MEQ: 1.5 POWDER, FOR SOLUTION ORAL at 21:45

## 2025-02-27 RX ADMIN — FENTANYL CITRATE 100 MCG: 50 INJECTION, SOLUTION INTRAMUSCULAR; INTRAVENOUS at 11:11

## 2025-02-27 RX ADMIN — CEFAZOLIN 2000 MG: 2 INJECTION, POWDER, FOR SOLUTION INTRAMUSCULAR; INTRAVENOUS at 11:12

## 2025-02-27 RX ADMIN — PROPOFOL 180 MG: 10 INJECTION, EMULSION INTRAVENOUS at 11:07

## 2025-02-27 RX ADMIN — THYROID, PORCINE 90 MG: 60 TABLET ORAL at 06:00

## 2025-02-27 RX ADMIN — ENOXAPARIN SODIUM 120 MG: 120 INJECTION SUBCUTANEOUS at 18:41

## 2025-02-27 RX ADMIN — ONDANSETRON 4 MG: 2 INJECTION INTRAMUSCULAR; INTRAVENOUS at 12:15

## 2025-02-27 RX ADMIN — Medication 10 ML: at 08:15

## 2025-02-27 RX ADMIN — SODIUM CHLORIDE, SODIUM LACTATE, POTASSIUM CHLORIDE, CALCIUM CHLORIDE 100 ML/HR: 20; 30; 600; 310 INJECTION, SOLUTION INTRAVENOUS at 10:57

## 2025-02-27 RX ADMIN — DEXAMETHASONE SODIUM PHOSPHATE 4 MG: 4 INJECTION, SOLUTION INTRAMUSCULAR; INTRAVENOUS at 11:19

## 2025-02-27 RX ADMIN — SUGAMMADEX 200 MG: 100 INJECTION, SOLUTION INTRAVENOUS at 12:15

## 2025-02-27 RX ADMIN — HYDROCHLOROTHIAZIDE 25 MG: 25 TABLET ORAL at 08:15

## 2025-02-27 RX ADMIN — HYDROCODONE BITARTRATE AND ACETAMINOPHEN 1 TABLET: 5; 325 TABLET ORAL at 16:40

## 2025-02-27 RX ADMIN — DEXAMETHASONE SODIUM PHOSPHATE 4 MG: 4 INJECTION, SOLUTION INTRA-ARTICULAR; INTRALESIONAL; INTRAMUSCULAR; INTRAVENOUS; SOFT TISSUE at 10:57

## 2025-02-27 RX ADMIN — FENTANYL CITRATE 50 MCG: 50 INJECTION, SOLUTION INTRAMUSCULAR; INTRAVENOUS at 12:54

## 2025-02-27 RX ADMIN — CEFAZOLIN 2000 MG: 2 INJECTION, POWDER, FOR SOLUTION INTRAMUSCULAR; INTRAVENOUS at 18:40

## 2025-02-27 NOTE — PROGRESS NOTES
1         [facility] Medicine Services  INPATIENT PROGRESS NOTE    Patient Name: Angie Evans  Date of Admission: 2/26/2025  Today's Date: 02/27/25  Length of Stay: 1  Primary Care Physician: Erendira Finney APRN    Subjective   Chief Complaint: Follow-up  HPI   Admitted yesterday for left lower extremity swelling pain and redness and been found on CT of the abdomen and pelvis to have left lower extremity DVT prompting further evaluation with CTA of chest which was negative for PE.  Patient was started on anticoagulation.    She was taken for surgery by Dr. Rubio today  PREOPERATIVE DIAGNOSIS: Acute deep vein thrombosis (DVT) of proximal vein of left lower extremity [I82.4Y2]     POSTOPERATIVE DIAGNOSIS: Post-Op Diagnosis Codes:     * Acute deep vein thrombosis (DVT) of proximal vein of left lower extremity [I82.4Y2]     PROCEDURE PERFORMED:   1.  Prone positioning  2.  Ultrasound-guided cannulation of the left popliteal vein  3.  Left lower extremity venogram with radiographic supervision and interpretation  4.  Mechanical thrombectomy of the left lower extremity common iliac, external iliac, common femoral, superficial femoral, and popliteal veins using the Penumbra 16 Urdu lightning flash aspiration catheter  5.  Balloon occlusion of the distal superficial femoral vein using an 8 x 40 mm EverCross balloon  6.  Coil embolization of a distal superficial femoral vein sidebranch using an 8 x 12 mm and (2) 8 x 24 mm Terumo CX coils      SURGEON: Eleazar Rubio DO        Noted patient has hypokalemia.  Patient been on potassium replacement.  Noted patient also on hydrochlorothiazide    Patient doing well postoperatively  Review of Systems   All pertinent negatives and positives are as above. All other systems have been reviewed and are negative unless otherwise stated.     Objective    Temp:  [97.7 °F (36.5 °C)-98.8 °F (37.1 °C)] 98.2 °F (36.8 °C)  Heart Rate:  []  "80  Resp:  [14-22] 16  BP: ()/(52-89) 108/57  Physical Exam  Flat in bed  No distress  Normal respiratory effort  Alert oriented x 3  Grossly nonfocal exam      Results Review:  I have reviewed the labs, radiology results, and diagnostic studies.    Laboratory Data:   Results from last 7 days   Lab Units 02/26/25  1755   WBC 10*3/mm3 12.59*   HEMOGLOBIN g/dL 13.6   HEMATOCRIT % 37.6   PLATELETS 10*3/mm3 263        Results from last 7 days   Lab Units 02/27/25  0428 02/26/25  1755   SODIUM mmol/L 134* 132*   POTASSIUM mmol/L 3.0* 3.0*   CHLORIDE mmol/L 97* 94*   CO2 mmol/L 23.0 22.0   BUN mg/dL 7 8   CREATININE mg/dL 0.65 0.73   CALCIUM mg/dL 8.5* 8.8   BILIRUBIN mg/dL  --  0.9   ALK PHOS U/L  --  92   ALT (SGPT) U/L  --  20   AST (SGOT) U/L  --  20   GLUCOSE mg/dL 105* 104*       Culture Data:   No results found for: \"BLOODCX\", \"URINECX\", \"WOUNDCX\", \"MRSACX\", \"RESPCX\", \"STOOLCX\"    Radiology Data:   Imaging Results (Last 24 Hours)       Procedure Component Value Units Date/Time    FL C Arm During Surgery [[id number]] Resulted: 02/27/25 1229     Updated: 02/27/25 1229    Narrative:      This procedure was auto-finalized with no dictation required.    IR Venogram Extremity [[id number]] Resulted: 02/27/25 1054     Updated: 02/27/25 1229    US Venous Doppler Lower Extremity Left (duplex) [[id number]] Resulted: 02/26/25 1824     Updated: 02/27/25 1144    US Guided Vascular Access [[id number]] Resulted: 02/27/25 1134     Updated: 02/27/25 1144    CT Angiogram Abdomen Pelvis [[id number]] Collected: 02/26/25 2041     Updated: 02/26/25 2048    Narrative:      EXAM: CT ANGIOGRAM ABDOMEN PELVIS- - 2/26/2025 7:15 PM     HISTORY: venous phasing for dvt- ordered by vascular; I82.4Y2-Acute  embolism and thrombosis of unspecified deep veins of left proximal lower  extremity       COMPARISON: None available.     Automatic exposure control was utilized to make radiation dose as low as  reasonably achievable.     TECHNIQUE: Enhanced " axial images of the abdomen and pelvis obtained with  multiplanar reformats. 3D postprocessing, including MIPs, performed and  images saved to PACS.     FINDINGS:  VISUALIZED CHEST: No pericardial or pleural effusion is seen. Lung bases  are grossly clear.     LIVER/BILIARY: Hepatic parenchyma is unremarkable. Patient is status  post cholecystectomy. Bile ducts are unremarkable.     KIDNEYS/URETERS: Bilateral kidneys and ureters are unremarkable. There  is no hydronephrosis.     ADRENAL: Unremarkable.     SPLEEN: Unremarkable.     PANCREAS: Unremarkable.        PERITONEUM/RETROPERITONEUM: No free air, ascites, or lymphadenopathy.     GI TRACT: There is no bowel obstruction or mass.     PELVIS: Uterus and adnexa are unremarkable. Urinary bladder is  incompletely distended. No free fluid or lymphadenopathy is seen.     SOFT TISSUES: There is edema in the left inguinal soft tissues and  subcutaneous soft tissues of the proximal left lower extremity.     BONES: No acute or aggressive bony lesion.            VESSELS:     AORTA/ABDOMINAL-PELVIC VESSELS: Abdominal aorta is unremarkable. No  aneurysm, stenosis, or occlusion is seen. Bilateral common iliac  arteries and bilateral internal and external iliac arteries are patent  without stenosis or occlusion.     There is enlargement of the proximal left external iliac vein with  filling defects consistent with deep venous thrombosis extends  anteriorly to the left common femoral vein and left superficial femoral  vein.             Impression:      1. Deep venous thrombosis on the left involving the external iliac vein,  common femoral vein, and superficial femoral vein and associated edema  in the subcutaneous soft tissues of the proximal left lower extremity  and left inguinal soft tissues.     This report was signed and finalized on 2/26/2025 8:45 PM by Giovanni Hernandez.       CT Angiogram Chest [[id number]] Collected: 02/26/25 2038     Updated: 02/26/25 2044    Narrative:       EXAM: CT ANGIOGRAM CHEST-      DATE: 2/26/2025 7:15 PM     HISTORY: Lower extremity DVT     COMPARISON: None available.     Automatic exposure control was utilized to make radiation dose as low as  reasonably achievable.     TECHNIQUE: Enhanced CT images of the chest obtained with multiplanar  reformats.     FINDINGS:     MEDIASTINUM/EXTRATHORACIC:   Thoracic aorta is unremarkable. There is no  significant coronary artery calcification. No pericardial or pleural  effusion is identified. No thoracic lymphadenopathy is seen.     PULMONARY ARTERIES: Pulmonary arteries are opacified and no filling  defects are seen.     LUNGS/AIRWAYS: There is mild scarring at the superior segment of the  left lower lobe. No pneumonia is seen. Airways are unremarkable.        INCLUDED UPPER ABDOMEN: The visualized portions of the upper abdomen are  within normal limits. There is a small hiatal hernia.     SOFT TISSUES: Submitted soft tissues of the chest are unremarkable.     BONES: No suspicious osseous lesions identified.       Impression:      1. No pulmonary embolism identified or active disease in the chest.     This report was signed and finalized on 2/26/2025 8:41 PM by Giovanni Hernandez.               I have reviewed the patient's current medications.     Assessment/Plan   Assessment  Active Hospital Problems    Diagnosis     Acute deep vein thrombosis (DVT) of left iliofemoral vein                Medical Decision Making  Number and Complexity of problems:     Acute left lower extremity DVT status post mechanical thrombectomy of the left lower extremity common iliac, external iliac, common femoral, superficial femoral, and popliteal veins using the Penumbra 16 Indonesian lightning flash aspiration catheter (February 27); currently on anticoagulation  -Other chronic medical conditions-  Hypothyroidism (euthyroid)  Hypertension continue care losartan.  Noted hydrochlorothiazide.  Patient received replacement protocol for  hypokalemia.  Will reassess use of medication in the setting.  Asthma  Hypokalemia-replace per protocol and follow-up; normal magnesium      Medications reviewed  acetaminophen, 650 mg, Oral, Q8H   Or  acetaminophen, 650 mg, Oral, Q8H   Or  acetaminophen, 650 mg, Rectal, Q8H  ceFAZolin, 2,000 mg, Intravenous, Q8H  cetirizine, 5 mg, Oral, Daily  enoxaparin, 1 mg/kg, Subcutaneous, Q12H  hydroCHLOROthiazide, 25 mg, Oral, Daily  losartan, 50 mg, Oral, Q24H  potassium chloride ER, 40 mEq, Oral, Q4H  potassium chloride, 10 mEq, Oral, BID  sodium chloride, 10 mL, Intravenous, Q12H  [Transfer Hold] Thyroid, 90 mg, Oral, Q AM        Conditions and Status  Fair  MDM Data  External documents reviewed: Reviewed  Cardiac tracing (EKG, telemetry) interpretation: --  Radiology interpretation: Reviewed  Labs reviewed: Yes  Any tests that were considered but not ordered: None     Decision rules/scores evaluated (example [id number], Wells, etc): None     Discussed with: Patient and mother.     Care Planning  Shared decision making: Patient and consultant  Code status and discussions: Full code    Disposition  Social Determinants of Health that impact treatment or disposition: Identified at this time  I expect the patient to be discharged to (TBD)            Electronically signed by Raul Kathleen MD, 02/27/25, 16:00 CST.

## 2025-02-27 NOTE — ANESTHESIA PROCEDURE NOTES
Airway  Urgency: elective    Date/Time: 2/27/2025 11:07 AM  Airway not difficult    General Information and Staff    Patient location during procedure: OR    Indications and Patient Condition  Indications for airway management: airway protection    Preoxygenated: yes  Mask difficulty assessment: 1 - vent by mask    Final Airway Details  Final airway type: endotracheal airway      Successful airway: ETT  Cuffed: yes   Successful intubation technique: direct laryngoscopy  Endotracheal tube insertion site: oral  Blade: Reid  Blade size: 2  ETT size (mm): 7.0  Cormack-Lehane Classification: grade I - full view of glottis  Placement verified by: chest auscultation and capnometry   Measured from: teeth  ETT/EBT  to teeth (cm): 21  Number of attempts at approach: 1  Assessment: lips, teeth, and gum same as pre-op and atraumatic intubation

## 2025-02-27 NOTE — ED PROVIDER NOTES
Subjective   History of Present Illness  Patient is a 49-year-old female who presents to the ER with chief complaints of left leg pain.  She states she began experiencing discomfort yesterday along with swelling to the left leg.  No previous history of DVT.  No fevers.  No history of cellulitis.  Patient denies any chest pain or shortness of breath.  Past medical history significant for asthma, thyroid disease, hypertension        Review of Systems   Constitutional: Negative.    HENT: Negative.     Eyes: Negative.    Respiratory: Negative.     Cardiovascular: Negative.  Positive for leg swelling.   Gastrointestinal: Negative.    Endocrine: Negative.    Genitourinary: Negative.    Musculoskeletal: Negative.         Positive for left leg pain   Skin: Negative.    Allergic/Immunologic: Negative.    Neurological: Negative.    Hematological: Negative.    Psychiatric/Behavioral: Negative.     All other systems reviewed and are negative.      Past Medical History:   Diagnosis Date    Asthma     COVID-19     Disease of thyroid gland     Hypertension        Allergies   Allergen Reactions    Zithromax [Azithromycin] Anaphylaxis       Past Surgical History:   Procedure Laterality Date    CHOLECYSTECTOMY      CYST REMOVAL Left     arm       Family History   Problem Relation Age of Onset    Breast cancer Maternal Grandmother     Breast cancer Paternal Grandmother        Social History     Socioeconomic History    Marital status:    Tobacco Use    Smoking status: Never    Smokeless tobacco: Never   Vaping Use    Vaping status: Never Used   Substance and Sexual Activity    Alcohol use: Never    Drug use: Never    Sexual activity: Defer           Objective   Physical Exam  Vitals and nursing note reviewed.   Constitutional:       Appearance: She is well-developed. She is obese.   HENT:      Head: Normocephalic and atraumatic.      Right Ear: External ear normal.      Left Ear: External ear normal.      Nose: Nose normal.       Mouth/Throat:      Pharynx: Oropharynx is clear.   Eyes:      Extraocular Movements: Extraocular movements intact.      Conjunctiva/sclera: Conjunctivae normal.   Cardiovascular:      Rate and Rhythm: Normal rate and regular rhythm.      Heart sounds: Normal heart sounds.   Pulmonary:      Effort: Pulmonary effort is normal.      Breath sounds: Normal breath sounds.   Abdominal:      General: Bowel sounds are normal.      Palpations: Abdomen is soft.   Musculoskeletal:         General: Swelling present. Normal range of motion.      Cervical back: Normal range of motion and neck supple.      Comments: Patient has extensive swelling and pain throughout the left leg, pulses palpable, slight warmth noted to the left leg with slight erythema noted to the left leg   Skin:     General: Skin is warm and dry.   Neurological:      Mental Status: She is alert and oriented to person, place, and time.   Psychiatric:         Mood and Affect: Mood normal.         Behavior: Behavior normal.         Thought Content: Thought content normal.         Judgment: Judgment normal.         Procedures           ED Course  ED Course as of 02/26/25 2003 Wed Feb 26, 2025   1901 Kelly Proctor on 2/26/2025  6:47 PM CST  Lt LEV prelim. (+) DVT. Thrombus noted in Lt CFV, SFJ, FV (prox and mid. Unable to visualize distal FV due to edema and body habitus), Pop V, Gastroc V., PTV and Peroneal V. Informed ordering provider of preliminary results. Final report pending.     [TW]      ED Course User Index  [TW] Cyn Gates APRN                                                       Medical Decision Making  Patient is a 49-year-old female who presents to the ER with chief complaints of left leg pain.  She states she began experiencing discomfort yesterday along with swelling to the left leg.  No previous history of DVT.  No fevers.  No history of cellulitis.  Patient denies any chest pain or shortness of breath.  Past medical history significant  for asthma, thyroid disease, hypertension    Differential diagnosis includes but not limited to DVT, cellulitis, PE, and other etiologies    Amount and/or Complexity of Data Reviewed  Labs: ordered.  Radiology: ordered.    Risk  Prescription drug management.      Labs Reviewed   COMPREHENSIVE METABOLIC PANEL - Abnormal; Notable for the following components:       Result Value    Glucose 104 (*)     Sodium 132 (*)     Potassium 3.0 (*)     Chloride 94 (*)     Anion Gap 16.0 (*)     All other components within normal limits    Narrative:     GFR Categories in Chronic Kidney Disease (CKD)      GFR Category          GFR (mL/min/1.73)    Interpretation  G1                     90 or greater         Normal or high (1)  G2                      60-89                Mild decrease (1)  G3a                   45-59                Mild to moderate decrease  G3b                   30-44                Moderate to severe decrease  G4                    15-29                Severe decrease  G5                    14 or less           Kidney failure          (1)In the absence of evidence of kidney disease, neither GFR category G1 or G2 fulfill the criteria for CKD.    eGFR calculation 2021 CKD-EPI creatinine equation, which does not include race as a factor   CBC WITH AUTO DIFFERENTIAL - Abnormal; Notable for the following components:    WBC 12.59 (*)     MCHC 36.2 (*)     Neutrophil % 81.8 (*)     Lymphocyte % 12.3 (*)     Monocyte % 4.3 (*)     Neutrophils, Absolute 10.29 (*)     All other components within normal limits   PROTIME-INR - Normal   MAGNESIUM - Normal   POCT PEFORM URINE PREGNANCY   CBC AND DIFFERENTIAL    Narrative:     The following orders were created for panel order CBC & Differential.  Procedure                               Abnormality         Status                     ---------                               -----------         ------                     CBC Auto Differential[[id number]]        Abnormal             Final result                 Please view results for these tests on the individual orders.      US Venous Doppler Lower Extremity Left (duplex)    (Results Pending)   CT Angiogram Chest    (Results Pending)   CT Angiogram Abdomen Pelvis    (Results Pending)      Patient has an extensive DVT to the left lower extremity.  Spoke with [facility] on-call for vascular who wants CT of the abdomen with venous phasing for DVT and we also ordered CTA of the chest both pending.  Patient was given Lovenox per vascular recommendations.  Patient's potassium was mildly low and have ordered p.o. potassium and ordered magnesium lab which remains pending. Spoke with hospitalist for admission.  Please see their note for details.      Final diagnoses:   Acute deep vein thrombosis (DVT) of proximal vein of left lower extremity       ED Disposition  ED Disposition       ED Disposition   Decision to Admit    Condition   --    Comment   Level of Care: Telemetry [5]   Diagnosis: DVT (deep venous thrombosis) [746031]   Admitting Physician: DIANA CRAIN [[id number]]   Attending Physician: DIANA CRAIN [[id number]]   Certification: I Certify That Inpatient Hospital Services Are Medically Necessary For Greater Than 2 Midnights                 No follow-up provider specified.       Medication List      No changes were made to your prescriptions during this visit.            Cyn Gates APRN  02/26/25 2004

## 2025-02-27 NOTE — ANESTHESIA PREPROCEDURE EVALUATION
Anesthesia Evaluation     Patient summary reviewed   no history of anesthetic complications:   NPO Solid Status: > 8 hours  NPO Liquid Status: > 2 hours           Airway   Mallampati: I  TM distance: >3 FB  No difficulty expected  Dental - normal exam     Pulmonary    (+) asthma,  (-) sleep apnea, not a smoker  Cardiovascular   Exercise tolerance: good (4-7 METS)    (+) hypertension, DVT  (-) past MI, dysrhythmias, cardiac stents, hyperlipidemia      Neuro/Psych  (-) seizures, TIA, CVA  GI/Hepatic/Renal/Endo    (+) obesity, thyroid problem   (-) liver disease, no renal disease, diabetes    Musculoskeletal     Abdominal    Substance History      OB/GYN          Other                          Anesthesia Plan    ASA 3 - emergent     general     intravenous induction     Anesthetic plan, risks, benefits, and alternatives have been provided, discussed and informed consent has been obtained with: patient.        CODE STATUS:    Level Of Support Discussed With: Patient; Health Care Surrogate  Code Status (Patient has no pulse and is not breathing): CPR (Attempt to Resuscitate)  Medical Interventions (Patient has pulse or is breathing): Full Support

## 2025-02-27 NOTE — CONSULTS
Angie Evans  [id number]  [id number]  [facility] OR  Raul Kathleen*  2/26/2025    Chief Complaint   Patient presents with    Leg Swelling       HPI: Ms. Evans is a 49-year-old female with past medical history of asthma, hypothyroidism and hypertension, who presented to the emergency room with acute onset of left lower extremity swelling, pain, and redness.  She has admitted to immobilization recently with a broken toe.  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 In the emergency room, CTA of the abdomen and pelvis showed left lower extremity iliofemoral DVT.  CTA of the chest was negative for PE. She was given 1 dose of Lovenox in the emergency room.    Past Medical History:   Diagnosis Date    Asthma     COVID-19     Disease of thyroid gland     Hypertension        Past Surgical History:   Procedure Laterality Date    CHOLECYSTECTOMY      CYST REMOVAL Left     arm       Family History   Problem Relation Age of Onset    Breast cancer Maternal Grandmother     Breast cancer Paternal Grandmother        Social History     Socioeconomic History    Marital status:    Tobacco Use    Smoking status: Never    Smokeless tobacco: Never   Vaping Use    Vaping status: Never Used   Substance and Sexual Activity    Alcohol use: Never    Drug use: Never    Sexual activity: Defer       Allergies   Allergen Reactions    Zithromax [Azithromycin] Anaphylaxis       Hospital Medications (active)         Dose Frequency Start End    acetaminophen (TYLENOL) 160 MG/5ML oral solution 650 mg 650 mg Every 4 Hours PRN 2/26/2025 --    Admin Instructions: If given for fever, use fever parameter: fever greater than 100.4 °F  Based on patient request - if ordered for moderate or severe pain, provider allows for administration of a medication prescribed for a lower pain scale.    Do not exceed 4 grams of acetaminophen in a 24 hr period. Max dose of 2gm for AST/ALT  "greater than 120 units/L.    If given for pain, use the following pain scale:   Mild Pain = Pain Score of 1-3, CPOT 1-2  Moderate Pain = Pain Score of 4-6, CPOT 3-4  Severe Pain = Pain Score of 7-10, CPOT 5-8    Route: Oral    Linked Group 1: Placed in \"Or\" Linked Group        acetaminophen (TYLENOL) suppository 650 mg 650 mg Every 4 Hours PRN 2/26/2025 --    Admin Instructions: If given for fever, use fever parameter: fever greater than 100.4 °F  Based on patient request - if ordered for moderate or severe pain, provider allows for administration of a medication prescribed for a lower pain scale.    Do not exceed 4 grams of acetaminophen in a 24 hr period. Max dose of 2gm for AST/ALT greater than 120 units/L.    If given for pain, use the following pain scale:   Mild Pain = Pain Score of 1-3, CPOT 1-2  Moderate Pain = Pain Score of 4-6, CPOT 3-4  Severe Pain = Pain Score of 7-10, CPOT 5-8    Route: Rectal    Linked Group 1: Placed in \"Or\" Linked Group        acetaminophen (TYLENOL) tablet 650 mg 650 mg Every 4 Hours PRN 2/26/2025 --    Admin Instructions: If given for fever, use fever parameter: fever greater than 100.4 °F  Based on patient request - if ordered for moderate or severe pain, provider allows for administration of a medication prescribed for a lower pain scale.    Do not exceed 4 grams of acetaminophen in a 24 hr period. Max dose of 2gm for AST/ALT greater than 120 units/L.    If given for pain, use the following pain scale:   Mild Pain = Pain Score of 1-3, CPOT 1-2  Moderate Pain = Pain Score of 4-6, CPOT 3-4  Severe Pain = Pain Score of 7-10, CPOT 5-8    Route: Oral    Linked Group 1: Placed in \"Or\" Linked Group        bisacodyl (DULCOLAX) EC tablet 5 mg 5 mg Daily PRN 2/26/2025 --    Admin Instructions: Use if no bowel movement after 12 hours.  Swallow whole. Do not crush, split, or chew tablet.    Route: Oral    Linked Group 2: Placed in \"And\" Linked Group        bisacodyl (DULCOLAX) suppository 10 " "mg 10 mg Daily PRN 2/26/2025 --    Admin Instructions: Use if no bowel movement after 12 hours.  Hold for diarrhea    Route: Rectal    Linked Group 2: Placed in \"And\" Linked Group        Calcium Replacement - Follow Nurse / BPA Driven Protocol  As Needed 2/26/2025 --    Admin Instructions: Open Order & Select \"BHS Electrolyte Replacement Protocol Algorithm\" to View Details    Route: Not Applicable    ceFAZolin 2000 mg IVPB in 100 mL NS (MBP) 2,000 mg Once 2/27/2025 --    Admin Instructions: Administer Within 1 Hour of Surgical Incision.  Redose 4 Hours From Pre-Op Dose if Procedure Ongoing or Blood Loss Greater  Than 1.5 L    Caution: Look alike/sound alike drug alert    Route: Intravenous    cetirizine (zyrTEC) tablet 5 mg 5 mg Daily 2/27/2025 --    Route: Oral    Enoxaparin Sodium (LOVENOX) syringe 120 mg 1 mg/kg × 113 kg Every 12 Hours 2/27/2025 --    Admin Instructions: Give subcutaneous in abdomen only. Do not massage site after injection.    Route: Subcutaneous    hydroCHLOROthiazide tablet 25 mg 25 mg Daily 2/27/2025 --    Admin Instructions: Hold for SBP less than 100, DBP less than 60.  Caution: Look alike/sound alike drug alert    Route: Oral    HYDROcodone-acetaminophen (NORCO) 5-325 MG per tablet 1 tablet 1 tablet Every 6 Hours PRN 2/26/2025 3/3/2025    Admin Instructions: Based on patient request - if ordered for moderate or severe pain, provider allows for administration of a medication prescribed for a lower pain scale.  [MOR]    Do not exceed 4 grams of acetaminophen in a 24 hr period. Max dose of 2gm for AST/ALT greater than 120 units/L        If given for pain, use the following pain scale:   Mild Pain = Pain Score of 1-3, CPOT 1-2  Moderate Pain = Pain Score of 4-6, CPOT 3-4  Severe Pain = Pain Score of 7-10, CPOT 5-8    Route: Oral    lactated ringers infusion 100 mL/hr Continuous 2/27/2025 2/28/2025    Route: Intravenous    losartan (COZAAR) tablet 50 mg 50 mg Every 24 Hours Scheduled 2/27/2025 " "--    Admin Instructions: Hold for SBP less than 100, DBP less than 60.    Route: Oral    Magnesium Low Dose Replacement - Follow Nurse / BPA Driven Protocol  As Needed 2/26/2025 --    Admin Instructions: Open Order & Select \"BHS Electrolyte Replacement Protocol Algorithm\" to View Details    Route: Not Applicable    Midazolam HCl (PF) (VERSED) injection 2 mg 2 mg Every 10 Minutes PRN 2/27/2025 --    Admin Instructions: May repeat dose in 10 minutes one time then contact provider for additional orders.      Route: Intravenous    Morphine sulfate (PF) injection 1 mg 1 mg Every 4 Hours PRN 2/26/2025 3/3/2025    Admin Instructions: Based on patient request - if ordered for moderate or severe pain, provider allows for administration of a medication prescribed for a lower pain scale.    Caution: Look alike/sound alike drug alert    If given for pain, use the following pain scale:  Mild Pain = Pain Score of 1-3, CPOT 1-2  Moderate Pain = Pain Score of 4-6, CPOT 3-4  Severe Pain = Pain Score of 7-10, CPOT 5-8    Route: Intravenous    Linked Group 3: Placed in \"And\" Linked Group        naloxone (NARCAN) injection 0.4 mg 0.4 mg Every 5 Minutes PRN 2/26/2025 --    Admin Instructions: If respiratory rate is less than 8 breaths/minute or patient is difficult to arouse stop any narcotics and contact physician.   Administer slow IV push. Repeat as ordered until patient's respiratory rate is greater than 12 breaths/minute.    Route: Intravenous    Linked Group 3: Placed in \"And\" Linked Group        ondansetron (ZOFRAN) injection 4 mg 4 mg Every 6 Hours PRN 2/26/2025 --    Admin Instructions: If BOTH ondansetron (ZOFRAN) and promethazine (PHENERGAN) are ordered use ondansetron first and THEN promethazine IF ondansetron is ineffective.    Route: Intravenous    Phosphorus Replacement - Follow Nurse / BPA Driven Protocol  As Needed 2/26/2025 --    Admin Instructions: Open Order & Select \"BHS Electrolyte Replacement Protocol Algorithm\" " "to View Details    Route: Not Applicable    polyethylene glycol (MIRALAX) packet 17 g 17 g Daily PRN 2/26/2025 --    Admin Instructions: Use if no bowel movement after 12 hours. Mix in 6-8 ounces of water.  Use 4-8 ounces of water, tea, or juice for each 17 gram dose.    Route: Oral    Linked Group 2: Placed in \"And\" Linked Group        potassium chloride (KLOR-CON M20) CR tablet 40 mEq 40 mEq Every 4 Hours 2/27/2025 2/27/2025    Admin Instructions: Do not crush or chew the capsules or tablets. The drug may not work as designed if the capsule or tablet is crushed or chewed. Swallow whole.  Take with food.    Route: Oral    potassium chloride (KLOR-CON) packet 10 mEq 10 mEq 2 Times Daily 2/26/2025 --    Admin Instructions: For use with a feeding tube. Mix in at least 4 oz. of liquid.    Route: Oral    Potassium Replacement - Follow Nurse / BPA Driven Protocol  As Needed 2/26/2025 --    Admin Instructions: Open Order & Select \"BHS Electrolyte Replacement Protocol Algorithm\" to View Details    Route: Not Applicable    sennosides-docusate (PERICOLACE) 8.6-50 MG per tablet 2 tablet 2 tablet 2 Times Daily PRN 2/26/2025 --    Admin Instructions: Start bowel management regimen if patient has not had a bowel movement after 12 hours.    Route: Oral    Linked Group 2: Placed in \"And\" Linked Group        sodium chloride 0.9 % flush 10 mL 10 mL As Needed 2/26/2025 --    Route: Intravenous    Linked Group 4: Placed in \"And\" Linked Group        sodium chloride 0.9 % flush 10 mL 10 mL Every 12 Hours Scheduled 2/26/2025 --    Route: Intravenous    sodium chloride 0.9 % flush 10 mL 10 mL As Needed 2/26/2025 --    Route: Intravenous    sodium chloride 0.9 % flush 3 mL 3 mL Every 12 Hours Scheduled 2/27/2025 --    Route: Intravenous    sodium chloride 0.9 % flush 3-10 mL 3-10 mL As Needed 2/27/2025 --    Route: Intravenous    sodium chloride 0.9 % infusion 40 mL 40 mL As Needed 2/26/2025 --    Admin Instructions: Following " "administration of an IV intermittent medication, flush line with 40mL NS at 100mL/hr.    Route: Intravenous    sodium chloride 0.9 % infusion 40 mL 40 mL As Needed 2/27/2025 2/28/2025    Admin Instructions: Following administration of an IV intermittent medication, flush line with 40mL NS at 100mL/hr.    Route: Intravenous    thyroid (ARMOUR) tablet 90 mg ([MAR Hold] since 2/27/2025 10:44 AM) 90 mg Every Early Morning 2/27/2025 --    Route: Oral            Review of Systems   Constitutional: Negative.    HENT: Negative.     Eyes: Negative.    Respiratory: Negative.     Cardiovascular: Negative.    Gastrointestinal: Negative.    Endocrine: Negative.    Genitourinary: Negative.    Musculoskeletal: Negative.    Skin: Negative.    Allergic/Immunologic: Negative.    Neurological: Negative.    Hematological: Negative.    Psychiatric/Behavioral: Negative.     All other systems reviewed and are negative.      /65 (BP Location: Right arm, Patient Position: Lying)   Pulse 88   Temp 98.2 °F (36.8 °C) (Oral)   Resp 19   Ht 172.7 cm (68\")   Wt 115 kg (253 lb)   LMP 01/28/2025   SpO2 95%   BMI 38.47 kg/m²     Physical Exam  Vitals and nursing note reviewed.   Constitutional:       Appearance: She is well-developed.   HENT:      Head: Normocephalic and atraumatic.   Eyes:      General: No scleral icterus.     Pupils: Pupils are equal, round, and reactive to light.   Neck:      Thyroid: No thyromegaly.      Vascular: No carotid bruit or JVD.   Cardiovascular:      Rate and Rhythm: Normal rate and regular rhythm.      Pulses:           Carotid pulses are 2+ on the right side and 2+ on the left side.       Femoral pulses are 2+ on the right side and 2+ on the left side.       Popliteal pulses are 2+ on the right side and 2+ on the left side.        Dorsalis pedis pulses are 2+ on the right side and 2+ on the left side.        Posterior tibial pulses are 2+ on the right side and 2+ on the left side.      Heart sounds: " Normal heart sounds.      Comments: Left leg swelling  Pulmonary:      Effort: Pulmonary effort is normal.      Breath sounds: Normal breath sounds.   Abdominal:      General: Bowel sounds are normal. There is no distension or abdominal bruit.      Palpations: Abdomen is soft. There is no mass.      Tenderness: There is no abdominal tenderness.   Musculoskeletal:         General: Normal range of motion.      Cervical back: Neck supple.      Left lower leg: Edema present.   Lymphadenopathy:      Cervical: No cervical adenopathy.   Skin:     General: Skin is warm and dry.   Neurological:      Mental Status: She is alert and oriented to person, place, and time.      Cranial Nerves: No cranial nerve deficit.      Sensory: No sensory deficit.         Laboratory Data:  Results from last 7 days   Lab Units 02/26/25  1755   WBC 10*3/mm3 12.59*   HEMOGLOBIN g/dL 13.6   HEMATOCRIT % 37.6   PLATELETS 10*3/mm3 263       Results from last 7 days   Lab Units 02/27/25  0428 02/26/25  1755   SODIUM mmol/L 134* 132*   POTASSIUM mmol/L 3.0* 3.0*   CHLORIDE mmol/L 97* 94*   CO2 mmol/L 23.0 22.0   BUN mg/dL 7 8   CREATININE mg/dL 0.65 0.73   CALCIUM mg/dL 8.5* 8.8   BILIRUBIN mg/dL  --  0.9   ALK PHOS U/L  --  92   ALT (SGPT) U/L  --  20   AST (SGOT) U/L  --  20   GLUCOSE mg/dL 105* 104*     Results from last 7 days   Lab Units 02/26/25  1755   PROTIME Seconds 13.6   INR  0.99         Diagnostic Data:  Imaging Results (Last 24 Hours)       Procedure Component Value Units Date/Time    IR Venogram Extremity [[id number]] Resulted: 02/27/25 1054     Updated: 02/27/25 1054    CT Angiogram Abdomen Pelvis [[id number]] Collected: 02/26/25 2041     Updated: 02/26/25 2048    Narrative:      EXAM: CT ANGIOGRAM ABDOMEN PELVIS- - 2/26/2025 7:15 PM     HISTORY: venous phasing for dvt- ordered by vascular; I82.4Y2-Acute  embolism and thrombosis of unspecified deep veins of left proximal lower  extremity       COMPARISON: None available.     Automatic  exposure control was utilized to make radiation dose as low as  reasonably achievable.     TECHNIQUE: Enhanced axial images of the abdomen and pelvis obtained with  multiplanar reformats. 3D postprocessing, including MIPs, performed and  images saved to PACS.     FINDINGS:  VISUALIZED CHEST: No pericardial or pleural effusion is seen. Lung bases  are grossly clear.     LIVER/BILIARY: Hepatic parenchyma is unremarkable. Patient is status  post cholecystectomy. Bile ducts are unremarkable.     KIDNEYS/URETERS: Bilateral kidneys and ureters are unremarkable. There  is no hydronephrosis.     ADRENAL: Unremarkable.     SPLEEN: Unremarkable.     PANCREAS: Unremarkable.        PERITONEUM/RETROPERITONEUM: No free air, ascites, or lymphadenopathy.     GI TRACT: There is no bowel obstruction or mass.     PELVIS: Uterus and adnexa are unremarkable. Urinary bladder is  incompletely distended. No free fluid or lymphadenopathy is seen.     SOFT TISSUES: There is edema in the left inguinal soft tissues and  subcutaneous soft tissues of the proximal left lower extremity.     BONES: No acute or aggressive bony lesion.            VESSELS:     AORTA/ABDOMINAL-PELVIC VESSELS: Abdominal aorta is unremarkable. No  aneurysm, stenosis, or occlusion is seen. Bilateral common iliac  arteries and bilateral internal and external iliac arteries are patent  without stenosis or occlusion.     There is enlargement of the proximal left external iliac vein with  filling defects consistent with deep venous thrombosis extends  anteriorly to the left common femoral vein and left superficial femoral  vein.             Impression:      1. Deep venous thrombosis on the left involving the external iliac vein,  common femoral vein, and superficial femoral vein and associated edema  in the subcutaneous soft tissues of the proximal left lower extremity  and left inguinal soft tissues.     This report was signed and finalized on 2/26/2025 8:45 PM by  Giovanni Hernandez.       CT Angiogram Chest [[id number]] Collected: 02/26/25 2038     Updated: 02/26/25 2044    Narrative:      EXAM: CT ANGIOGRAM CHEST-      DATE: 2/26/2025 7:15 PM     HISTORY: Lower extremity DVT     COMPARISON: None available.     Automatic exposure control was utilized to make radiation dose as low as  reasonably achievable.     TECHNIQUE: Enhanced CT images of the chest obtained with multiplanar  reformats.     FINDINGS:     MEDIASTINUM/EXTRATHORACIC:   Thoracic aorta is unremarkable. There is no  significant coronary artery calcification. No pericardial or pleural  effusion is identified. No thoracic lymphadenopathy is seen.     PULMONARY ARTERIES: Pulmonary arteries are opacified and no filling  defects are seen.     LUNGS/AIRWAYS: There is mild scarring at the superior segment of the  left lower lobe. No pneumonia is seen. Airways are unremarkable.        INCLUDED UPPER ABDOMEN: The visualized portions of the upper abdomen are  within normal limits. There is a small hiatal hernia.     SOFT TISSUES: Submitted soft tissues of the chest are unremarkable.     BONES: No suspicious osseous lesions identified.       Impression:      1. No pulmonary embolism identified or active disease in the chest.     This report was signed and finalized on 2/26/2025 8:41 PM by Giovanni Hernandez.       US Venous Doppler Lower Extremity Left (duplex) [[id number]] Resulted: 02/26/25 1824     Updated: 02/26/25 1848            Impression:    DVT (deep venous thrombosis)      Plan: After thoroughly evaluating Angie Evans, I believe the best course of action is to proceed with mechanical thrombectomy of her left lower extremity iliofemoral DVT.  Risk/benefits were explained at great length to the patient which include but not limited to bleeding, infection, vessel damage, nerve damage, and pulmonary embolus.  The patient understands the risks and wished for me to proceed.  Moving forward, the patient will need to be  on oral anticoagulation for at least 3 to 6 months.  This was all discussed in full with complete understanding.  Thank you for allowing me to see Angie Evans in consult. Please feel free to reach out with any questions or concerns.    Eleazar Rubio, DO  2/27/2025  11:00 CST

## 2025-02-27 NOTE — ANESTHESIA POSTPROCEDURE EVALUATION
"Patient: Angie Evans    Procedure Summary       Date: 02/27/25 Room / Location: [facility] OR  /  [facility] OR    Anesthesia Start: 1102 Anesthesia Stop: 1232    Procedure: DVT THROMBOLYSIS LEFT LOWER EXTREMITY (Bilateral: Groin) Diagnosis:       Acute deep vein thrombosis (DVT) of proximal vein of left lower extremity      (Acute deep vein thrombosis (DVT) of proximal vein of left lower extremity [I82.4Y2])    Surgeons: Eleazar Rubio DO Provider: Alfonzo Quintana CRNA    Anesthesia Type: general ASA Status: 3 - Emergent            Anesthesia Type: general    Vitals  Vitals Value Taken Time   /57 02/27/25 1336   Temp 98.2 °F (36.8 °C) 02/27/25 1336   Pulse 82 02/27/25 1341   Resp 16 02/27/25 1336   SpO2 96 % 02/27/25 1341   Vitals shown include unfiled device data.        Post Anesthesia Care and Evaluation    Patient location during evaluation: PACU  Patient participation: complete - patient participated  Level of consciousness: awake and alert  Pain management: adequate    Airway patency: patent  Anesthetic complications: No anesthetic complications  PONV Status: none  Cardiovascular status: acceptable and hemodynamically stable  Respiratory status: acceptable  Hydration status: acceptable    Comments: Blood pressure 110/63, pulse 88, temperature 99.3 °F (37.4 °C), temperature source Oral, resp. rate 18, height 172.7 cm (68\"), weight 115 kg (253 lb), last menstrual period 01/28/2025, SpO2 98%.    Patient discharged from PACU based upon Alf score. Please see RN notes for further details    "

## 2025-02-27 NOTE — H&P
[facility]  HISTORY AND PHYSICAL    Date of Admission: 2/26/2025  Primary Care Physician: Erendira Finney APRN    Subjective   Primary Historian: Patient    Chief Complaint: Left lower extremity swelling, pain and redness    History of Present Illness  Ms. vEans is a 49-year-old female with past medical history of asthma, hypothyroidism and hypertension, who presented to the emergency room with left lower extremity pain, swelling and redness, history was provided by patient.  She noticed acute onset swelling of her left lower extremity with later became painful and red, symptom continued up to this morning prompting her emergency room visit.  She denied any long distance travel, recent surgery or immobilization.  In the emergency room, CTA of the abdomen and pelvis showed left lower extremity DVT, prompting further evaluation with CTA chest which was negative for PE.  She was given 1 dose of Lovenox in the emergency room,  And vascular surgery was consulted and they recommended to get CTA abdomen and pelvis with venous phase.      Review of Systems   Otherwise complete ROS reviewed and negative except as mentioned in the HPI.    Past Medical History:   Past Medical History:   Diagnosis Date    Asthma     COVID-19     Disease of thyroid gland     Hypertension      Past Surgical History:  Past Surgical History:   Procedure Laterality Date    CHOLECYSTECTOMY      CYST REMOVAL Left     arm     Social History:  reports that she has never smoked. She has never used smokeless tobacco. She reports that she does not drink alcohol and does not use drugs.    Family History: family history includes Breast cancer in her maternal grandmother and paternal grandmother.       Allergies:  Allergies   Allergen Reactions    Zithromax [Azithromycin] Anaphylaxis       Medications:  Prior to Admission medications    Medication Sig Start Date End Date Taking? Authorizing  "Provider   busPIRone (BUSPAR) 5 MG tablet As Needed. 8/18/23  Yes Meli Walsh MD   cetirizine (zyrTEC) 5 MG tablet Take 1 tablet by mouth Daily.   Yes Meli Wlash MD   hydroCHLOROthiazide (HYDRODIURIL) 25 MG tablet Take 1 tablet by mouth Daily.   Yes Meli Walsh MD   losartan (COZAAR) 50 MG tablet 2 (Two) Times a Day. 8/18/23  Yes Meli Walsh MD   norgestimate-ethinyl estradiol (ORTHO TRI-CYCLEN,TRINESSA) 0.18/0.215/0.25 MG-35 MCG per tablet  8/18/23  Yes Meli Walsh MD   NP Thyroid 60 MG tablet Daily. 8/18/23  Yes Meli Walsh MD   nystatin (MYCOSTATIN) 997862 UNIT/GM ointment As Needed. 8/18/23  Yes Meli Walsh MD   potassium chloride (KLOR-CON) 20 MEQ packet Take 10 mEq by mouth 2 (Two) Times a Day.   Yes Meli Walsh MD   albuterol sulfate  (90 Base) MCG/ACT inhaler  8/18/23   Meli Walsh MD   methylPREDNISolone (MEDROL) 4 MG dose pack Take as directed on package instructions. 1/28/25   Cecilia Araiza, EVER     I have utilized all available immediate resources to obtain, update, or review the patient's current medications (including all prescriptions, over-the-counter products, herbals, cannabis/cannabidiol products, and vitamin/mineral/dietary (nutritional) supplements).    Objective     Vital Signs: /85 (BP Location: Left arm, Patient Position: Lying)   Pulse 91   Temp 98.5 °F (36.9 °C) (Oral)   Resp 18   Ht 172.7 cm (68\")   Wt 115 kg (253 lb)   LMP 01/28/2025   SpO2 96%   BMI 38.47 kg/m²   Physical Exam   GEN: Awake, alert, interactive, in NAD  HEENT: Atraumatic, PERRLA, EOMI, Anicteric, Trachea midline  Lungs: CTAB, no wheezing/rales/rhonchi  Heart: RRR, +S1/s2, no rub  ABD: soft, nt/nd, +BS, no guarding/rebound  Extremities: Left lower extremity redness, swelling and tenderness  Skin: no rashes or lesions  Neuro: AAOx3, no focal deficits  Psych: normal mood & affect       Results Reviewed:  Lab " Results (last 24 hours)       Procedure Component Value Units Date/Time    POC Urine Pregnancy [[id number]]  (Normal) Collected: 02/26/25 2059    Specimen: Urine Updated: 02/26/25 2101     HCG, Urine, QL Negative     Lot Number [id number]     Internal Positive Control Positive     Internal Negative Control Negative     Expiration Date 04/22/2026    Magnesium [[id number]]  (Normal) Collected: 02/26/25 1755    Specimen: Blood Updated: 02/26/25 1935     Magnesium 1.9 mg/dL     Comprehensive Metabolic Panel [[id number]]  (Abnormal) Collected: 02/26/25 1755    Specimen: Blood Updated: 02/26/25 1829     Glucose 104 mg/dL      BUN 8 mg/dL      Creatinine 0.73 mg/dL      Sodium 132 mmol/L      Potassium 3.0 mmol/L      Chloride 94 mmol/L      CO2 22.0 mmol/L      Calcium 8.8 mg/dL      Total Protein 7.4 g/dL      Albumin 3.8 g/dL      ALT (SGPT) 20 U/L      AST (SGOT) 20 U/L      Alkaline Phosphatase 92 U/L      Total Bilirubin 0.9 mg/dL      Globulin 3.6 gm/dL      A/G Ratio 1.1 g/dL      BUN/Creatinine Ratio 11.0     Anion Gap 16.0 mmol/L      eGFR 100.3 mL/min/1.73     Narrative:      GFR Categories in Chronic Kidney Disease (CKD)      GFR Category          GFR (mL/min/1.73)    Interpretation  G1                     90 or greater         Normal or high (1)  G2                      60-89                Mild decrease (1)  G3a                   45-59                Mild to moderate decrease  G3b                   30-44                Moderate to severe decrease  G4                    15-29                Severe decrease  G5                    14 or less           Kidney failure          (1)In the absence of evidence of kidney disease, neither GFR category G1 or G2 fulfill the criteria for CKD.    eGFR calculation 2021 CKD-EPI creatinine equation, which does not include race as a factor    Protime-INR [[id number]]  (Normal) Collected: 02/26/25 1755    Specimen: Blood Updated: 02/26/25 1822     Protime 13.6 Seconds      INR 0.99     CBC & Differential [[id number]]  (Abnormal) Collected: 02/26/25 1755    Specimen: Blood Updated: 02/26/25 1811    Narrative:      The following orders were created for panel order CBC & Differential.  Procedure                               Abnormality         Status                     ---------                               -----------         ------                     CBC Auto Differential[[id number]]        Abnormal            Final result                 Please view results for these tests on the individual orders.    CBC Auto Differential [[id number]]  (Abnormal) Collected: 02/26/25 1755    Specimen: Blood Updated: 02/26/25 1811     WBC 12.59 10*3/mm3      RBC 4.33 10*6/mm3      Hemoglobin 13.6 g/dL      Hematocrit 37.6 %      MCV 86.8 fL      MCH 31.4 pg      MCHC 36.2 g/dL      RDW 14.4 %      RDW-SD 46.0 fl      MPV 9.3 fL      Platelets 263 10*3/mm3      Neutrophil % 81.8 %      Lymphocyte % 12.3 %      Monocyte % 4.3 %      Eosinophil % 1.0 %      Basophil % 0.3 %      Immature Grans % 0.3 %      Neutrophils, Absolute 10.29 10*3/mm3      Lymphocytes, Absolute 1.55 10*3/mm3      Monocytes, Absolute 0.54 10*3/mm3      Eosinophils, Absolute 0.13 10*3/mm3      Basophils, Absolute 0.04 10*3/mm3      Immature Grans, Absolute 0.04 10*3/mm3      nRBC 0.0 /100 WBC           Imaging Results (Last 24 Hours)       Procedure Component Value Units Date/Time    CT Angiogram Abdomen Pelvis [[id number]] Collected: 02/26/25 2041     Updated: 02/26/25 2048    Narrative:      EXAM: CT ANGIOGRAM ABDOMEN PELVIS- - 2/26/2025 7:15 PM     HISTORY: venous phasing for dvt- ordered by vascular; I82.4Y2-Acute  embolism and thrombosis of unspecified deep veins of left proximal lower  extremity       COMPARISON: None available.     Automatic exposure control was utilized to make radiation dose as low as  reasonably achievable.     TECHNIQUE: Enhanced axial images of the abdomen and pelvis obtained with  multiplanar reformats. 3D  postprocessing, including MIPs, performed and  images saved to PACS.     FINDINGS:  VISUALIZED CHEST: No pericardial or pleural effusion is seen. Lung bases  are grossly clear.     LIVER/BILIARY: Hepatic parenchyma is unremarkable. Patient is status  post cholecystectomy. Bile ducts are unremarkable.     KIDNEYS/URETERS: Bilateral kidneys and ureters are unremarkable. There  is no hydronephrosis.     ADRENAL: Unremarkable.     SPLEEN: Unremarkable.     PANCREAS: Unremarkable.        PERITONEUM/RETROPERITONEUM: No free air, ascites, or lymphadenopathy.     GI TRACT: There is no bowel obstruction or mass.     PELVIS: Uterus and adnexa are unremarkable. Urinary bladder is  incompletely distended. No free fluid or lymphadenopathy is seen.     SOFT TISSUES: There is edema in the left inguinal soft tissues and  subcutaneous soft tissues of the proximal left lower extremity.     BONES: No acute or aggressive bony lesion.            VESSELS:     AORTA/ABDOMINAL-PELVIC VESSELS: Abdominal aorta is unremarkable. No  aneurysm, stenosis, or occlusion is seen. Bilateral common iliac  arteries and bilateral internal and external iliac arteries are patent  without stenosis or occlusion.     There is enlargement of the proximal left external iliac vein with  filling defects consistent with deep venous thrombosis extends  anteriorly to the left common femoral vein and left superficial femoral  vein.             Impression:      1. Deep venous thrombosis on the left involving the external iliac vein,  common femoral vein, and superficial femoral vein and associated edema  in the subcutaneous soft tissues of the proximal left lower extremity  and left inguinal soft tissues.     This report was signed and finalized on 2/26/2025 8:45 PM by Giovanni Hernandez.       CT Angiogram Chest [[id number]] Collected: 02/26/25 2038     Updated: 02/26/25 2044    Narrative:      EXAM: CT ANGIOGRAM CHEST-      DATE: 2/26/2025 7:15 PM     HISTORY: Lower  extremity DVT     COMPARISON: None available.     Automatic exposure control was utilized to make radiation dose as low as  reasonably achievable.     TECHNIQUE: Enhanced CT images of the chest obtained with multiplanar  reformats.     FINDINGS:     MEDIASTINUM/EXTRATHORACIC:   Thoracic aorta is unremarkable. There is no  significant coronary artery calcification. No pericardial or pleural  effusion is identified. No thoracic lymphadenopathy is seen.     PULMONARY ARTERIES: Pulmonary arteries are opacified and no filling  defects are seen.     LUNGS/AIRWAYS: There is mild scarring at the superior segment of the  left lower lobe. No pneumonia is seen. Airways are unremarkable.        INCLUDED UPPER ABDOMEN: The visualized portions of the upper abdomen are  within normal limits. There is a small hiatal hernia.     SOFT TISSUES: Submitted soft tissues of the chest are unremarkable.     BONES: No suspicious osseous lesions identified.       Impression:      1. No pulmonary embolism identified or active disease in the chest.     This report was signed and finalized on 2/26/2025 8:41 PM by Giovanni Hernandez.       US Venous Doppler Lower Extremity Left (duplex) [[id number]] Resulted: 02/26/25 1824     Updated: 02/26/25 1848          I have personally reviewed and interpreted the radiology studies and ECG obtained at time of admission.     Assessment / Plan   Assessment:   Active Hospital Problems    Diagnosis     **DVT (deep venous thrombosis)        Treatment Plan  The patient will be admitted to my service here at [facility].     -Acute left lower extremity DVT  CT of the abdomen and pelvis with lower extremity runoff showed left DVT, she was given 1 dose of Lovenox full-strength and vascular surgery was consulted with recommendation to get CT abdomen and pelvis with venous phase, follow vascular recommendations  Will continue patient on full-strength Lovenox and possibly transition to oral anticoagulation  tomorrow for disposition planning.  Left lower extremity venous Doppler is still pending  Patient is on estrogen replacement therapy, which could predispose her to DVT.    All the same, PCP will refer to hematologist outpatient for hypercoagulable state evaluation [discussed with patient and family].    -Other chronic medical conditions-  Hypothyroidism  Hypertension  Asthma    DVT prophylaxis-full-strength Lovenox      Medical Decision Making  Number and Complexity of problems: 1  Differential Diagnosis: Left lower extremity DVT, left lower extremity cellulitis    Conditions and Status        Condition is unchanged.     MDM Data  External documents reviewed: No  Cardiac tracing (EKG, telemetry) interpretation: No  Radiology interpretation: Yes  Labs reviewed: Yes  Any tests that were considered but not ordered: No     Decision rules/scores evaluated (example [id number], Wells, etc): No     Discussed with: Patient and family at bedside     Care Planning  Shared decision making: Yes, with patient and family [ and daughter]  Code status and discussions: Full code, discussed with patient and family    Disposition  Social Determinants of Health that impact treatment or disposition: No  Estimated length of stay is 1 day.     I confirmed that the patient's advanced care plan is present, code status is documented, and a surrogate decision maker is listed in the patient's medical record.     The patient's surrogate decision maker is .     The patient was seen and examined by me on 2/26/2025 at 9:30 PM.    Electronically signed by Emir Abad MD, 02/26/25, 23:53 CST.

## 2025-02-27 NOTE — PLAN OF CARE
Problem: Adult Inpatient Plan of Care  Goal: Plan of Care Review  Outcome: Progressing  Flowsheets (Taken 2/27/2025 0576)  Progress: improving  Outcome Evaluation: Pt a/o x4. VSS. Telemetry NSR 60-97. Maintaining SpO2 on RA. LLE Pain treated with prn norco with good effect. +2-3 LLE edema noted. Tender to touch. Pt rested well overnight.  at bedside. Npo after midnight per order.  Plan of Care Reviewed With:   patient   spouse

## 2025-02-27 NOTE — PAYOR COMM NOTE
"2/27/25 [facility] [phone]  [phone]      ER ADMIT TO INPATIENT ON 2/26/25. FAXING FOR INPATIENT REVIEW.    Angie Evans (49 y.o. Female)       Date of Birth   1975    Social Security Number       Address   [address] [place] [address] [address] [place] [address]    Home Phone   [phone]    MRN   [id number]       Evangelical   Non-Rastafarian    Marital Status                               Admission Date   2/26/25    Admission Type   Emergency    Admitting Provider   Raul Kathleen MD    Attending Provider   Raul Kathleen MD    Department, Room/Bed   [facility] 4B, 450/1       Discharge Date       Discharge Disposition       Discharge Destination                                 Attending Provider: Raul Kathleen MD    Allergies: Zithromax [Azithromycin]    Isolation: None   Infection: None   Code Status: CPR    Ht: 172.7 cm (68\")   Wt: 115 kg (253 lb)    Admission Cmt: None   Principal Problem: None                  Active Insurance as of 2/26/2025       Primary Coverage       Payor Plan Insurance Group Employer/Plan Group    ANTHEM BLUE CROSS ANTHEM BLUE CROSS BLUE SHIELD PPO [id number]       Payor Plan Address Payor Plan Phone Number Payor Plan Fax Number Effective Dates    [address] [phone]  8/1/2021 - None Entered    [place] [address]         Subscriber Name Subscriber Birth Date Member ID       CYNTHIA EVANS P 6/[birth year]/1976 [id number]                     Emergency Contacts        (Rel.) Home Phone Work Phone Mobile Phone    SABRINA CHAMBERS (Mother) -- -- [phone]    Janna Evans (Daughter) -- -- [phone]    Cynthia Evans (Spouse) [phone] -- [phone]             [facility] Encounter Date/Time: 2/26/2025 [address] [place] [address] Account: [id number]    MRN: [id number]   Patient:  Angie Evans   Contact Serial #: [id number]   SSN:          ENCOUNTER             Patient Class: " Inpatient   Unit: [address]   Hospital Service: Medicine     Bed: 450/1   Admitting Provider: Raul Kathleen*   Referring Physician:     Attending Provider: Raul Kathleen*   Adm Diagnosis: DVT (deep venous thrombo*               PATIENT             Name: Angie Evans : 1975 (49 yrs)   Address: [address] Sex: Female   City: [place] [address]   County: [place]   Marital Status:  Ethnicity: NOT        Race: WHITE   Primary Care Provider: Erendira Finney, * Patients Phone: Home Phone: [phone]     Mobile Phone: [phone]     EMERGENCY CONTACT   Contact Name Legal Guardian? Relationship to Patient Home Phone Work Phone Mobile Phone   1. SABRINA CHAMBERS  2. Janna Evans      Mother  Daughter           [phone]   GUARANTOR             Guarantor: Angie Evans     : 1975   Address: [address] Sex: [place] [address]     Relation to Patient: Self       Home Phone: [phone]   Guarantor ID: [id number]       Work Phone:     GUARANTOR EMPLOYER   Employer: [employer]         Status: FULL TIME   COVERAGE          PRIMARY INSURANCE   Payor: ANTHEM BLUE CROSS Plan: ANTHEM BLUE CROSS BLUE SHIELD PPO   Group Number: [id number] Insurance Type: INDEMNITY   Subscriber Name: CYNTHIA EVANS Subscriber : 1977   Subscriber ID: [id number] Coverage Address: [place] [address]   Pat. Rel. to Subscriber: Spouse Coverage Phone: [phone]   SECONDARY INSURANCE   Payor: N/A Plan: N/A   Group Number:   Insurance Type:     Subscriber Name:   Subscriber :     Subscriber ID:   Coverage Address:     Pat. Rel. to Subscriber:   Coverage Phone:        Contact Serial # ([id number])         2025    Chart ID ([id number])         Cyn Gates APRN   Nurse Practitioner  Emergency Medicine     ED Provider Notes      Cosign Needed     Date of Service: 25  1930  Creation Time: 02/26/25 1930     Cosign Needed       Expand All Collapse All       Subjective  History of Present Illness  Patient is a 49-year-old female who presents to the ER with chief complaints of left leg pain.  She states she began experiencing discomfort yesterday along with swelling to the left leg.  No previous history of DVT.  No fevers.  No history of cellulitis.  Patient denies any chest pain or shortness of breath.  Past medical history significant for asthma, thyroid disease, hypertension           Review of Systems   Constitutional: Negative.    HENT: Negative.     Eyes: Negative.    Respiratory: Negative.     Cardiovascular: Negative.  Positive for leg swelling.   Gastrointestinal: Negative.    Endocrine: Negative.    Genitourinary: Negative.    Musculoskeletal: Negative.         Positive for left leg pain   Skin: Negative.    Allergic/Immunologic: Negative.    Neurological: Negative.    Hematological: Negative.    Psychiatric/Behavioral: Negative.     All other systems reviewed and are negative.        Medical History        Past Medical History:   Diagnosis Date    Asthma      COVID-19      Disease of thyroid gland      Hypertension              Allergies        Allergies   Allergen Reactions    Zithromax [Azithromycin] Anaphylaxis            Surgical History         Past Surgical History:   Procedure Laterality Date    CHOLECYSTECTOMY        CYST REMOVAL Left       arm                  Family History   Problem Relation Age of Onset    Breast cancer Maternal Grandmother      Breast cancer Paternal Grandmother           Social History   Social History           Socioeconomic History    Marital status:    Tobacco Use    Smoking status: Never    Smokeless tobacco: Never   Vaping Use    Vaping status: Never Used   Substance and Sexual Activity    Alcohol use: Never    Drug use: Never    Sexual activity: Defer                     Objective  Physical Exam  Vitals and nursing note reviewed.    Constitutional:       Appearance: She is well-developed. She is obese.   HENT:      Head: Normocephalic and atraumatic.      Right Ear: External ear normal.      Left Ear: External ear normal.      Nose: Nose normal.      Mouth/Throat:      Pharynx: Oropharynx is clear.   Eyes:      Extraocular Movements: Extraocular movements intact.      Conjunctiva/sclera: Conjunctivae normal.   Cardiovascular:      Rate and Rhythm: Normal rate and regular rhythm.      Heart sounds: Normal heart sounds.   Pulmonary:      Effort: Pulmonary effort is normal.      Breath sounds: Normal breath sounds.   Abdominal:      General: Bowel sounds are normal.      Palpations: Abdomen is soft.   Musculoskeletal:         General: Swelling present. Normal range of motion.      Cervical back: Normal range of motion and neck supple.      Comments: Patient has extensive swelling and pain throughout the left leg, pulses palpable, slight warmth noted to the left leg with slight erythema noted to the left leg   Skin:     General: Skin is warm and dry.   Neurological:      Mental Status: She is alert and oriented to person, place, and time.   Psychiatric:         Mood and Affect: Mood normal.         Behavior: Behavior normal.         Thought Content: Thought content normal.         Judgment: Judgment normal.            Procedures                 ED Course      ED Course as of 02/26/25 2003 Wed Feb 26, 2025   1901 Kelly Proctor on 2/26/2025  6:47 PM CST  Lt LEV prelim. (+) DVT. Thrombus noted in Lt CFV, SFJ, FV (prox and mid. Unable to visualize distal FV due to edema and body habitus), Pop V, Gastroc V., PTV and Peroneal V. Informed ordering provider of preliminary results. Final report pending.      [TW]       ED Course User Index  [TW] Cyn Gates APRN                                                      Medical Decision Making  Patient is a 49-year-old female who presents to the ER with chief complaints of left leg pain.  She states  she began experiencing discomfort yesterday along with swelling to the left leg.  No previous history of DVT.  No fevers.  No history of cellulitis.  Patient denies any chest pain or shortness of breath.  Past medical history significant for asthma, thyroid disease, hypertension     Differential diagnosis includes but not limited to DVT, cellulitis, PE, and other etiologies     Amount and/or Complexity of Data Reviewed  Labs: ordered.  Radiology: ordered.     Risk  Prescription drug management.               Emir Abad MD   Physician  Hospitalist     H&P      Signed     Date of Service: 02/26/25 2353  Creation Time: 02/26/25 2353     Signed       Expand All Collapse [facility] Medicine Services  HISTORY AND PHYSICAL     Date of Admission: 2/26/2025  Primary Care Physician: Erendira Finney APRN     Subjective   Primary Historian: Patient     Chief Complaint: Left lower extremity swelling, pain and redness     History of Present Illness  Ms. Evans is a 49-year-old female with past medical history of asthma, hypothyroidism and hypertension, who presented to the emergency room with left lower extremity pain, swelling and redness, history was provided by patient.  She noticed acute onset swelling of her left lower extremity with later became painful and red, symptom continued up to this morning prompting her emergency room visit.  She denied any long distance travel, recent surgery or immobilization.  In the emergency room, CTA of the abdomen and pelvis showed left lower extremity DVT, prompting further evaluation with CTA chest which was negative for PE.  She was given 1 dose of Lovenox in the emergency room,  And vascular surgery was consulted and they recommended to get CTA abdomen and pelvis with venous phase.        Review of Systems   Otherwise complete ROS reviewed and negative except as mentioned in the HPI.     Past Medical History:   Medical  History[]Expand by Default        Past Medical History:   Diagnosis Date    Asthma      COVID-19      Disease of thyroid gland      Hypertension           Past Surgical History:  Surgical History         Past Surgical History:   Procedure Laterality Date    CHOLECYSTECTOMY        CYST REMOVAL Left       arm         Social History:  reports that she has never smoked. She has never used smokeless tobacco. She reports that she does not drink alcohol and does not use drugs.     Family History: family history includes Breast cancer in her maternal grandmother and paternal grandmother.        Allergies:  Allergies        Allergies   Allergen Reactions    Zithromax [Azithromycin] Anaphylaxis            Medications:          Prior to Admission medications    Medication Sig Start Date End Date Taking? Authorizing Provider   busPIRone (BUSPAR) 5 MG tablet As Needed. 8/18/23   Yes Meli Walsh MD   cetirizine (zyrTEC) 5 MG tablet Take 1 tablet by mouth Daily.     Yes Meli Walsh MD   hydroCHLOROthiazide (HYDRODIURIL) 25 MG tablet Take 1 tablet by mouth Daily.     Yes ProviderMeli MD   losartan (COZAAR) 50 MG tablet 2 (Two) Times a Day. 8/18/23   Yes Meli Walsh MD   norgestimate-ethinyl estradiol (ORTHO TRI-CYCLEN,TRINESSA) 0.18/0.215/0.25 MG-35 MCG per tablet   8/18/23   Yes Meli Walsh MD   NP Thyroid 60 MG tablet Daily. 8/18/23   Yes Meli Walsh MD   nystatin (MYCOSTATIN) 037105 UNIT/GM ointment As Needed. 8/18/23   Yes ProviderMeli MD   potassium chloride (KLOR-CON) 20 MEQ packet Take 10 mEq by mouth 2 (Two) Times a Day.     Yes ProviderMeli MD   albuterol sulfate  (90 Base) MCG/ACT inhaler   8/18/23     Meli Walsh MD   methylPREDNISolone (MEDROL) 4 MG dose pack Take as directed on package instructions. 1/28/25     Cecilia Araiza, EVER      I have utilized all available immediate resources to obtain, update, or review the  "patient's current medications (including all prescriptions, over-the-counter products, herbals, cannabis/cannabidiol products, and vitamin/mineral/dietary (nutritional) supplements).     Objective      Vital Signs: /85 (BP Location: Left arm, Patient Position: Lying)   Pulse 91   Temp 98.5 °F (36.9 °C) (Oral)   Resp 18   Ht 172.7 cm (68\")   Wt 115 kg (253 lb)   LMP 01/28/2025   SpO2 96%   BMI 38.47 kg/m²   Physical Exam   GEN: Awake, alert, interactive, in NAD  HEENT: Atraumatic, PERRLA, EOMI, Anicteric, Trachea midline  Lungs: CTAB, no wheezing/rales/rhonchi  Heart: RRR, +S1/s2, no rub  ABD: soft, nt/nd, +BS, no guarding/rebound  Extremities: Left lower extremity redness, swelling and tenderness  Skin: no rashes or lesions  Neuro: AAOx3, no focal deficits  Psych: normal mood & affect         Results Reviewed:  Lab Results (last 24 hours)         Procedure Component Value Units Date/Time     POC Urine Pregnancy [[id number]]  (Normal) Collected: 02/26/25 2059     Specimen: Urine Updated: 02/26/25 2101       HCG, Urine, QL Negative       Lot Number [id number]       Internal Positive Control Positive       Internal Negative Control Negative       Expiration Date 04/22/2026     Magnesium [[id number]]  (Normal) Collected: 02/26/25 1755     Specimen: Blood Updated: 02/26/25 1935       Magnesium 1.9 mg/dL       Comprehensive Metabolic Panel [[id number]]  (Abnormal) Collected: 02/26/25 1755     Specimen: Blood Updated: 02/26/25 1829       Glucose 104 mg/dL         BUN 8 mg/dL         Creatinine 0.73 mg/dL         Sodium 132 mmol/L         Potassium 3.0 mmol/L         Chloride 94 mmol/L         CO2 22.0 mmol/L         Calcium 8.8 mg/dL         Total Protein 7.4 g/dL         Albumin 3.8 g/dL         ALT (SGPT) 20 U/L         AST (SGOT) 20 U/L         Alkaline Phosphatase 92 U/L         Total Bilirubin 0.9 mg/dL         Globulin 3.6 gm/dL         A/G Ratio 1.1 g/dL         BUN/Creatinine Ratio 11.0       Anion Gap 16.0 " mmol/L         eGFR 100.3 mL/min/1.73       Narrative:       GFR Categories in Chronic Kidney Disease (CKD)                         GFR Category          GFR (mL/min/1.73)    Interpretation  G1                       90 or greater              Normal or high (1)  G2                               60-89                Mild decrease (1)  G3a                   45-59                Mild to moderate decrease  G3b                   30-44                Moderate to severe decrease  G4                    15-29                Severe decrease  G5                    14 or less           Kidney failure                                                 (1)In the absence of evidence of kidney disease, neither GFR category G1 or G2 fulfill the criteria for CKD.     eGFR calculation 2021 CKD-EPI creatinine equation, which does not include race as a factor     Protime-INR [[id number]]  (Normal) Collected: 02/26/25 1755     Specimen: Blood Updated: 02/26/25 1822       Protime 13.6 Seconds         INR 0.99     CBC & Differential [[id number]]  (Abnormal) Collected: 02/26/25 1755     Specimen: Blood Updated: 02/26/25 1811     Narrative:       The following orders were created for panel order CBC & Differential.  Procedure                               Abnormality         Status                     ---------                               -----------         ------                     CBC Auto Differential[[id number]]        Abnormal            Final result                  Please view results for these tests on the individual orders.     CBC Auto Differential [[id number]]  (Abnormal) Collected: 02/26/25 1755     Specimen: Blood Updated: 02/26/25 1811       WBC 12.59 10*3/mm3         RBC 4.33 10*6/mm3         Hemoglobin 13.6 g/dL         Hematocrit 37.6 %         MCV 86.8 fL         MCH 31.4 pg         MCHC 36.2 g/dL         RDW 14.4 %         RDW-SD 46.0 fl         MPV 9.3 fL         Platelets 263 10*3/mm3         Neutrophil % 81.8 %          Lymphocyte % 12.3 %         Monocyte % 4.3 %         Eosinophil % 1.0 %         Basophil % 0.3 %         Immature Grans % 0.3 %         Neutrophils, Absolute 10.29 10*3/mm3         Lymphocytes, Absolute 1.55 10*3/mm3         Monocytes, Absolute 0.54 10*3/mm3         Eosinophils, Absolute 0.13 10*3/mm3         Basophils, Absolute 0.04 10*3/mm3         Immature Grans, Absolute 0.04 10*3/mm3         nRBC 0.0 /100 WBC               Imaging Results (Last 24 Hours)         Procedure Component Value Units Date/Time     CT Angiogram Abdomen Pelvis [[id number]] Collected: 02/26/25 2041       Updated: 02/26/25 2048     Narrative:       EXAM: CT ANGIOGRAM ABDOMEN PELVIS- - 2/26/2025 7:15 PM     HISTORY: venous phasing for dvt- ordered by vascular; I82.4Y2-Acute  embolism and thrombosis of unspecified deep veins of left proximal lower  extremity       COMPARISON: None available.     Automatic exposure control was utilized to make radiation dose as low as  reasonably achievable.     TECHNIQUE: Enhanced axial images of the abdomen and pelvis obtained with  multiplanar reformats. 3D postprocessing, including MIPs, performed and  images saved to PACS.     FINDINGS:  VISUALIZED CHEST: No pericardial or pleural effusion is seen. Lung bases  are grossly clear.     LIVER/BILIARY: Hepatic parenchyma is unremarkable. Patient is status  post cholecystectomy. Bile ducts are unremarkable.     KIDNEYS/URETERS: Bilateral kidneys and ureters are unremarkable. There  is no hydronephrosis.     ADRENAL: Unremarkable.     SPLEEN: Unremarkable.     PANCREAS: Unremarkable.        PERITONEUM/RETROPERITONEUM: No free air, ascites, or lymphadenopathy.     GI TRACT: There is no bowel obstruction or mass.     PELVIS: Uterus and adnexa are unremarkable. Urinary bladder is  incompletely distended. No free fluid or lymphadenopathy is seen.     SOFT TISSUES: There is edema in the left inguinal soft tissues and  subcutaneous soft tissues of the proximal left  lower extremity.     BONES: No acute or aggressive bony lesion.            VESSELS:     AORTA/ABDOMINAL-PELVIC VESSELS: Abdominal aorta is unremarkable. No  aneurysm, stenosis, or occlusion is seen. Bilateral common iliac  arteries and bilateral internal and external iliac arteries are patent  without stenosis or occlusion.     There is enlargement of the proximal left external iliac vein with  filling defects consistent with deep venous thrombosis extends  anteriorly to the left common femoral vein and left superficial femoral  vein.              Impression:       1. Deep venous thrombosis on the left involving the external iliac vein,  common femoral vein, and superficial femoral vein and associated edema  in the subcutaneous soft tissues of the proximal left lower extremity  and left inguinal soft tissues.     This report was signed and finalized on 2/26/2025 8:45 PM by Giovanni Hernandez.        CT Angiogram Chest [[id number]] Collected: 02/26/25 2038       Updated: 02/26/25 2044     Narrative:       EXAM: CT ANGIOGRAM CHEST-      DATE: 2/26/2025 7:15 PM     HISTORY: Lower extremity DVT     COMPARISON: None available.     Automatic exposure control was utilized to make radiation dose as low as  reasonably achievable.     TECHNIQUE: Enhanced CT images of the chest obtained with multiplanar  reformats.     FINDINGS:     MEDIASTINUM/EXTRATHORACIC:   Thoracic aorta is unremarkable. There is no  significant coronary artery calcification. No pericardial or pleural  effusion is identified. No thoracic lymphadenopathy is seen.     PULMONARY ARTERIES: Pulmonary arteries are opacified and no filling  defects are seen.     LUNGS/AIRWAYS: There is mild scarring at the superior segment of the  left lower lobe. No pneumonia is seen. Airways are unremarkable.        INCLUDED UPPER ABDOMEN: The visualized portions of the upper abdomen are  within normal limits. There is a small hiatal hernia.     SOFT TISSUES: Submitted soft  tissues of the chest are unremarkable.     BONES: No suspicious osseous lesions identified.        Impression:       1. No pulmonary embolism identified or active disease in the chest.     This report was signed and finalized on 2/26/2025 8:41 PM by Giovanni Hernandez.        US Venous Doppler Lower Extremity Left (duplex) [[id number]] Resulted: 02/26/25 1824       Updated: 02/26/25 1848             I have personally reviewed and interpreted the radiology studies and ECG obtained at time of admission.      Assessment / Plan   Assessment:        Active Hospital Problems     Diagnosis      **DVT (deep venous thrombosis)           Treatment Plan  The patient will be admitted to my service here at [facility].      -Acute left lower extremity DVT  CT of the abdomen and pelvis with lower extremity runoff showed left DVT, she was given 1 dose of Lovenox full-strength and vascular surgery was consulted with recommendation to get CT abdomen and pelvis with venous phase, follow vascular recommendations  Will continue patient on full-strength Lovenox and possibly transition to oral anticoagulation tomorrow for disposition planning.  Left lower extremity venous Doppler is still pending  Patient is on estrogen replacement therapy, which could predispose her to DVT.    All the same, PCP will refer to hematologist outpatient for hypercoagulable state evaluation [discussed with patient and family].     -Other chronic medical conditions-  Hypothyroidism  Hypertension  Asthma     DVT prophylaxis-full-strength Lovenox        Medical Decision Making  Number and Complexity of problems: 1  Differential Diagnosis: Left lower extremity DVT, left lower extremity cellulitis     Conditions and Status        Condition is unchanged.     [facility] Data  External documents reviewed: No  Cardiac tracing (EKG, telemetry) interpretation: No  Radiology interpretation: Yes  Labs reviewed: Yes  Any tests that were considered but not ordered: No      Decision rules/scores evaluated (example [id number], [place], etc): No     Discussed with: Patient and family at bedside     Care Planning  Shared decision making: Yes, with patient and family [ and daughter]  Code status and discussions: Full code, discussed with patient and family     Disposition  Social Determinants of Health that impact treatment or disposition: No  Estimated length of stay is 1 day.      I confirmed that the patient's advanced care plan is present, code status is documented, and a surrogate decision maker is listed in the patient's medical record.      The patient's surrogate decision maker is .      The patient was seen and examined by me on 2/26/2025 at 9:30 PM.     Electronically signed by Emir Abad MD, 02/26/25, 23:53 CST.                   Joyce Ley MD   Anesthesiologist  Specialty: Anesthesiology     Anesthesia Preprocedure Evaluation     Signed     Date of Service: 02/27/25 1043  Creation Time: 02/27/25 1043     Signed          Anesthesia Evaluation      Patient summary reviewed   no history of anesthetic complications:   NPO Solid Status: > 8 hours  NPO Liquid Status: > 2 hours                         Eleazar Rubio DO   Physician  Vascular Surgery     Consults      Signed     Date of Service: 02/27/25 1100  Creation Time: 02/27/25 1100  Consult Orders   Vascular Surgery (on-call MD unless specified) [[id number]] ordered by Emir Abad MD at 02/26/25 1957          Signed       Expand All Collapse All    Angie Evans  [id number]  [id number]  [facility] OR/MAIN OR  Raul Kathleen*  2/26/2025         Chief Complaint   Patient presents with    Leg Swelling         HPI: Ms. Evans is a 49-year-old female with past medical history of asthma, hypothyroidism and hypertension, who presented to the emergency room with acute onset of left lower extremity swelling, pain, and redness.  She has admitted to immobilization recently with a broken  "toe.  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 In the emergency room, CTA of the abdomen and pelvis showed left lower extremity iliofemoral DVT.  CTA of the chest was negative for PE. She was given 1 dose of Lovenox in the emergency room.     Medical History[]Expand by Default        Past Medical History:   Diagnosis Date    Asthma      COVID-19      Disease of thyroid gland      Hypertension              Surgical History         Past Surgical History:   Procedure Laterality Date    CHOLECYSTECTOMY        CYST REMOVAL Left       arm                  Family History   Problem Relation Age of Onset    Breast cancer Maternal Grandmother      Breast cancer Paternal Grandmother           Social History   Social History           Socioeconomic History    Marital status:    Tobacco Use    Smoking status: Never    Smokeless tobacco: Never   Vaping Use    Vaping status: Never Used   Substance and Sexual Activity    Alcohol use: Never    Drug use: Never    Sexual activity: Defer            Allergies        Allergies   Allergen Reactions    Zithromax [Azithromycin] Anaphylaxis            Hospital Medications (active)           Dose Frequency Start End     acetaminophen (TYLENOL) 160 MG/5ML oral solution 650 mg 650 mg Every 4 Hours PRN 2/26/2025 --     Admin Instructions: If given for fever, use fever parameter: fever greater than 100.4 °F  Based on patient request - if ordered for moderate or severe pain, provider allows for administration of a medication prescribed for a lower pain scale.     Do not exceed 4 grams of acetaminophen in a 24 hr period. Max dose of 2gm for AST/ALT greater than 120 units/L.     If given for pain, use the following pain scale:   Mild Pain = Pain Score of 1-3, CPOT 1-2  Moderate Pain = Pain Score of 4-6, CPOT 3-4  Severe Pain = Pain Score of 7-10, CPOT 5-8     Route: Oral     Linked Group 1: Placed in \"Or\" Linked Group         " "    acetaminophen (TYLENOL) suppository 650 mg 650 mg Every 4 Hours PRN 2/26/2025 --     Admin Instructions: If given for fever, use fever parameter: fever greater than 100.4 °F  Based on patient request - if ordered for moderate or severe pain, provider allows for administration of a medication prescribed for a lower pain scale.     Do not exceed 4 grams of acetaminophen in a 24 hr period. Max dose of 2gm for AST/ALT greater than 120 units/L.     If given for pain, use the following pain scale:   Mild Pain = Pain Score of 1-3, CPOT 1-2  Moderate Pain = Pain Score of 4-6, CPOT 3-4  Severe Pain = Pain Score of 7-10, CPOT 5-8     Route: Rectal     Linked Group 1: Placed in \"Or\" Linked Group             acetaminophen (TYLENOL) tablet 650 mg 650 mg Every 4 Hours PRN 2/26/2025 --     Admin Instructions: If given for fever, use fever parameter: fever greater than 100.4 °F  Based on patient request - if ordered for moderate or severe pain, provider allows for administration of a medication prescribed for a lower pain scale.     Do not exceed 4 grams of acetaminophen in a 24 hr period. Max dose of 2gm for AST/ALT greater than 120 units/L.     If given for pain, use the following pain scale:   Mild Pain = Pain Score of 1-3, CPOT 1-2  Moderate Pain = Pain Score of 4-6, CPOT 3-4  Severe Pain = Pain Score of 7-10, CPOT 5-8     Route: Oral     Linked Group 1: Placed in \"Or\" Linked Group             bisacodyl (DULCOLAX) EC tablet 5 mg 5 mg Daily PRN 2/26/2025 --     Admin Instructions: Use if no bowel movement after 12 hours.  Swallow whole. Do not crush, split, or chew tablet.     Route: Oral     Linked Group 2: Placed in \"And\" Linked Group             bisacodyl (DULCOLAX) suppository 10 mg 10 mg Daily PRN 2/26/2025 --     Admin Instructions: Use if no bowel movement after 12 hours.  Hold for diarrhea     Route: Rectal     Linked Group 2: Placed in \"And\" Linked Group             Calcium Replacement - Follow Nurse / BPA Driven " "Protocol   As Needed 2/26/2025 --     Admin Instructions: Open Order & Select \"[facility] Electrolyte Replacement Protocol Algorithm\" to View Details     Route: Not Applicable     ceFAZolin 2000 mg IVPB in 100 mL NS (MBP) 2,000 mg Once 2/27/2025 --     Admin Instructions: Administer Within 1 Hour of Surgical Incision.  Redose 4 Hours From Pre-Op Dose if Procedure Ongoing or Blood Loss Greater  Than 1.5 L     Caution: Look alike/sound alike drug alert     Route: Intravenous     cetirizine (zyrTEC) tablet 5 mg 5 mg Daily 2/27/2025 --     Route: Oral     Enoxaparin Sodium (LOVENOX) syringe 120 mg 1 mg/kg × 113 kg Every 12 Hours 2/27/2025 --     Admin Instructions: Give subcutaneous in abdomen only. Do not massage site after injection.     Route: Subcutaneous     hydroCHLOROthiazide tablet 25 mg 25 mg Daily 2/27/2025 --     Admin Instructions: Hold for SBP less than 100, DBP less than 60.  Caution: Look alike/sound alike drug alert     Route: Oral     HYDROcodone-acetaminophen (NORCO) 5-325 MG per tablet 1 tablet 1 tablet Every 6 Hours PRN 2/26/2025 3/3/2025     Admin Instructions: Based on patient request - if ordered for moderate or severe pain, provider allows for administration of a medication prescribed for a lower pain scale.  [MOR]     Do not exceed 4 grams of acetaminophen in a 24 hr period. Max dose of 2gm for AST/ALT greater than 120 units/L           If given for pain, use the following pain scale:   Mild Pain = Pain Score of 1-3, CPOT 1-2  Moderate Pain = Pain Score of 4-6, CPOT 3-4  Severe Pain = Pain Score of 7-10, CPOT 5-8     Route: Oral     lactated ringers infusion 100 mL/hr Continuous 2/27/2025 2/28/2025     Route: Intravenous     losartan (COZAAR) tablet 50 mg 50 mg Every 24 Hours Scheduled 2/27/2025 --     Admin Instructions: Hold for SBP less than 100, DBP less than 60.     Route: Oral     Magnesium Low Dose Replacement - Follow Nurse / BPA Driven Protocol   As Needed 2/26/2025 --     Admin Instructions: " "Open Order & Select \"[facility] Electrolyte Replacement Protocol Algorithm\" to View Details     Route: Not Applicable     Midazolam HCl (PF) (VERSED) injection 2 mg 2 mg Every 10 Minutes PRN 2/27/2025 --     Admin Instructions: May repeat dose in 10 minutes one time then contact provider for additional orders.        Route: Intravenous     Morphine sulfate (PF) injection 1 mg 1 mg Every 4 Hours PRN 2/26/2025 3/3/2025     Admin Instructions: Based on patient request - if ordered for moderate or severe pain, provider allows for administration of a medication prescribed for a lower pain scale.     Caution: Look alike/sound alike drug alert     If given for pain, use the following pain scale:  Mild Pain = Pain Score of 1-3, CPOT 1-2  Moderate Pain = Pain Score of 4-6, CPOT 3-4  Severe Pain = Pain Score of 7-10, CPOT 5-8     Route: Intravenous     Linked Group 3: Placed in \"And\" Linked Group             naloxone (NARCAN) injection 0.4 mg 0.4 mg Every 5 Minutes PRN 2/26/2025 --     Admin Instructions: If respiratory rate is less than 8 breaths/minute or patient is difficult to arouse stop any narcotics and contact physician.   Administer slow IV push. Repeat as ordered until patient's respiratory rate is greater than 12 breaths/minute.     Route: Intravenous     Linked Group 3: Placed in \"And\" Linked Group             ondansetron (ZOFRAN) injection 4 mg 4 mg Every 6 Hours PRN 2/26/2025 --     Admin Instructions: If BOTH ondansetron (ZOFRAN) and promethazine (PHENERGAN) are ordered use ondansetron first and THEN promethazine IF ondansetron is ineffective.     Route: Intravenous     Phosphorus Replacement - Follow Nurse / BPA Driven Protocol   As Needed 2/26/2025 --     Admin Instructions: Open Order & Select \"[facility] Electrolyte Replacement Protocol Algorithm\" to View Details     Route: Not Applicable     polyethylene glycol (MIRALAX) packet 17 g 17 g Daily PRN 2/26/2025 --     Admin Instructions: Use if no bowel movement after 12 " "hours. Mix in 6-8 ounces of water.  Use 4-8 ounces of water, tea, or juice for each 17 gram dose.     Route: Oral     Linked Group 2: Placed in \"And\" Linked Group             potassium chloride (KLOR-CON M20) CR tablet 40 mEq 40 mEq Every 4 Hours 2/27/2025 2/27/2025     Admin Instructions: Do not crush or chew the capsules or tablets. The drug may not work as designed if the capsule or tablet is crushed or chewed. Swallow whole.  Take with food.     Route: Oral     potassium chloride (KLOR-CON) packet 10 mEq 10 mEq 2 Times Daily 2/26/2025 --     Admin Instructions: For use with a feeding tube. Mix in at least 4 oz. of liquid.     Route: Oral     Potassium Replacement - Follow Nurse / BPA Driven Protocol   As Needed 2/26/2025 --     Admin Instructions: Open Order & Select \"[facility] Electrolyte Replacement Protocol Algorithm\" to View Details     Route: Not Applicable     sennosides-docusate (PERICOLACE) 8.6-50 MG per tablet 2 tablet 2 tablet 2 Times Daily PRN 2/26/2025 --     Admin Instructions: Start bowel management regimen if patient has not had a bowel movement after 12 hours.     Route: Oral     Linked Group 2: Placed in \"And\" Linked Group             sodium chloride 0.9 % flush 10 mL 10 mL As Needed 2/26/2025 --     Route: Intravenous     Linked Group 4: Placed in \"And\" Linked Group             sodium chloride 0.9 % flush 10 mL 10 mL Every 12 Hours Scheduled 2/26/2025 --     Route: Intravenous     sodium chloride 0.9 % flush 10 mL 10 mL As Needed 2/26/2025 --     Route: Intravenous     sodium chloride 0.9 % flush 3 mL 3 mL Every 12 Hours Scheduled 2/27/2025 --     Route: Intravenous     sodium chloride 0.9 % flush 3-10 mL 3-10 mL As Needed 2/27/2025 --     Route: Intravenous     sodium chloride 0.9 % infusion 40 mL 40 mL As Needed 2/26/2025 --     Admin Instructions: Following administration of an IV intermittent medication, flush line with 40mL NS at 100mL/hr.     Route: Intravenous     sodium chloride 0.9 % " "infusion 40 mL 40 mL As Needed 2/27/2025 2/28/2025     Admin Instructions: Following administration of an IV intermittent medication, flush line with 40mL NS at 100mL/hr.     Route: Intravenous     thyroid (ARMOUR) tablet 90 mg ([MAR Hold] since 2/27/2025 10:44 AM) 90 mg Every Early Morning 2/27/2025 --     Route: Oral                Review of Systems   Constitutional: Negative.    HENT: Negative.     Eyes: Negative.    Respiratory: Negative.     Cardiovascular: Negative.    Gastrointestinal: Negative.    Endocrine: Negative.    Genitourinary: Negative.    Musculoskeletal: Negative.    Skin: Negative.    Allergic/Immunologic: Negative.    Neurological: Negative.    Hematological: Negative.    Psychiatric/Behavioral: Negative.     All other systems reviewed and are negative.        /65 (BP Location: Right arm, Patient Position: Lying)   Pulse 88   Temp 98.2 °F (36.8 °C) (Oral)   Resp 19   Ht 172.7 cm (68\")   Wt 115 kg (253 lb)   LMP 01/28/2025   SpO2 95%   BMI 38.47 kg/m²      Physical Exam  Vitals and nursing note reviewed.   Constitutional:       Appearance: She is well-developed.   HENT:      Head: Normocephalic and atraumatic.   Eyes:      General: No scleral icterus.     Pupils: Pupils are equal, round, and reactive to light.   Neck:      Thyroid: No thyromegaly.      Vascular: No carotid bruit or JVD.   Cardiovascular:      Rate and Rhythm: Normal rate and regular rhythm.      Pulses:           Carotid pulses are 2+ on the right side and 2+ on the left side.       Femoral pulses are 2+ on the right side and 2+ on the left side.       Popliteal pulses are 2+ on the right side and 2+ on the left side.        Dorsalis pedis pulses are 2+ on the right side and 2+ on the left side.        Posterior tibial pulses are 2+ on the right side and 2+ on the left side.      Heart sounds: Normal heart sounds.      Comments: Left leg swelling  Pulmonary:      Effort: Pulmonary effort is normal.      Breath " sounds: Normal breath sounds.   Abdominal:      General: Bowel sounds are normal. There is no distension or abdominal bruit.      Palpations: Abdomen is soft. There is no mass.      Tenderness: There is no abdominal tenderness.   Musculoskeletal:         General: Normal range of motion.      Cervical back: Neck supple.      Left lower leg: Edema present.   Lymphadenopathy:      Cervical: No cervical adenopathy.   Skin:     General: Skin is warm and dry.   Neurological:      Mental Status: She is alert and oriented to person, place, and time.      Cranial Nerves: No cranial nerve deficit.      Sensory: No sensory deficit.            Laboratory Data:       Results from last 7 days   Lab Units 02/26/25  1755   WBC 10*3/mm3 12.59*   HEMOGLOBIN g/dL 13.6   HEMATOCRIT % 37.6   PLATELETS 10*3/mm3 263               Results from last 7 days   Lab Units 02/27/25  0428 02/26/25  1755   SODIUM mmol/L 134* 132*   POTASSIUM mmol/L 3.0* 3.0*   CHLORIDE mmol/L 97* 94*   CO2 mmol/L 23.0 22.0   BUN mg/dL 7 8   CREATININE mg/dL 0.65 0.73   CALCIUM mg/dL 8.5* 8.8   BILIRUBIN mg/dL  --  0.9   ALK PHOS U/L  --  92   ALT (SGPT) U/L  --  20   AST (SGOT) U/L  --  20   GLUCOSE mg/dL 105* 104*           Results from last 7 days   Lab Units 02/26/25  1755   PROTIME Seconds 13.6   INR   0.99            Diagnostic Data:  Imaging Results (Last 24 Hours)         Procedure Component Value Units Date/Time     IR Venogram Extremity [[id number]] Resulted: 02/27/25 1054       Updated: 02/27/25 1054     CT Angiogram Abdomen Pelvis [[id number]] Collected: 02/26/25 2041       Updated: 02/26/25 2048     Narrative:       EXAM: CT ANGIOGRAM ABDOMEN PELVIS- - 2/26/2025 7:15 PM     HISTORY: venous phasing for dvt- ordered by vascular; I82.4Y2-Acute  embolism and thrombosis of unspecified deep veins of left proximal lower  extremity       COMPARISON: None available.     Automatic exposure control was utilized to make radiation dose as low as  reasonably  achievable.     TECHNIQUE: Enhanced axial images of the abdomen and pelvis obtained with  multiplanar reformats. 3D postprocessing, including MIPs, performed and  images saved to PACS.     FINDINGS:  VISUALIZED CHEST: No pericardial or pleural effusion is seen. Lung bases  are grossly clear.     LIVER/BILIARY: Hepatic parenchyma is unremarkable. Patient is status  post cholecystectomy. Bile ducts are unremarkable.     KIDNEYS/URETERS: Bilateral kidneys and ureters are unremarkable. There  is no hydronephrosis.     ADRENAL: Unremarkable.     SPLEEN: Unremarkable.     PANCREAS: Unremarkable.        PERITONEUM/RETROPERITONEUM: No free air, ascites, or lymphadenopathy.     GI TRACT: There is no bowel obstruction or mass.     PELVIS: Uterus and adnexa are unremarkable. Urinary bladder is  incompletely distended. No free fluid or lymphadenopathy is seen.     SOFT TISSUES: There is edema in the left inguinal soft tissues and  subcutaneous soft tissues of the proximal left lower extremity.     BONES: No acute or aggressive bony lesion.            VESSELS:     AORTA/ABDOMINAL-PELVIC VESSELS: Abdominal aorta is unremarkable. No  aneurysm, stenosis, or occlusion is seen. Bilateral common iliac  arteries and bilateral internal and external iliac arteries are patent  without stenosis or occlusion.     There is enlargement of the proximal left external iliac vein with  filling defects consistent with deep venous thrombosis extends  anteriorly to the left common femoral vein and left superficial femoral  vein.              Impression:       1. Deep venous thrombosis on the left involving the external iliac vein,  common femoral vein, and superficial femoral vein and associated edema  in the subcutaneous soft tissues of the proximal left lower extremity  and left inguinal soft tissues.     This report was signed and finalized on 2/26/2025 8:45 PM by Giovanni Hernandez.        CT Angiogram Chest [[id number]] Collected: 02/26/25 2038        Updated: 02/26/25 2044     Narrative:       EXAM: CT ANGIOGRAM CHEST-      DATE: 2/26/2025 7:15 PM     HISTORY: Lower extremity DVT     COMPARISON: None available.     Automatic exposure control was utilized to make radiation dose as low as  reasonably achievable.     TECHNIQUE: Enhanced CT images of the chest obtained with multiplanar  reformats.     FINDINGS:     MEDIASTINUM/EXTRATHORACIC:   Thoracic aorta is unremarkable. There is no  significant coronary artery calcification. No pericardial or pleural  effusion is identified. No thoracic lymphadenopathy is seen.     PULMONARY ARTERIES: Pulmonary arteries are opacified and no filling  defects are seen.     LUNGS/AIRWAYS: There is mild scarring at the superior segment of the  left lower lobe. No pneumonia is seen. Airways are unremarkable.        INCLUDED UPPER ABDOMEN: The visualized portions of the upper abdomen are  within normal limits. There is a small hiatal hernia.     SOFT TISSUES: Submitted soft tissues of the chest are unremarkable.     BONES: No suspicious osseous lesions identified.        Impression:       1. No pulmonary embolism identified or active disease in the chest.     This report was signed and finalized on 2/26/2025 8:41 PM by Giovanni Hernandez.        US Venous Doppler Lower Extremity Left (duplex) [[id number]] Resulted: 02/26/25 1824       Updated: 02/26/25 1848                Impression:    DVT (deep venous thrombosis)        Plan: After thoroughly evaluating Angie Evans, I believe the best course of action is to proceed with mechanical thrombectomy of her left lower extremity iliofemoral DVT.  Risk/benefits were explained at great length to the patient which include but not limited to bleeding, infection, vessel damage, nerve damage, and pulmonary embolus.  The patient understands the risks and wished for me to proceed.  Moving forward, the patient will need to be on oral anticoagulation for at least 3 to 6 months.  This was all  discussed in full with complete understanding.  Thank you for allowing me to see Angie Evans in consult. Please feel free to reach out with any questions or concerns.     Eleazar Rubio DO  2/27/2025  11:00 CST                    Eleazar Rubio DO   Physician  Vascular Surgery     Op Note      Signed     Date of Service: 02/27/25 1120  Creation Time: 02/27/25 1214  Case Time: Procedures: Surgeons:   02/27/25 1120 DVT THROMBOLYSIS LEFT LOWER EXTREMITY    Eleazar Rubio DO               Signed         Angie Evans  2/27/2025     PREOPERATIVE DIAGNOSIS: Acute deep vein thrombosis (DVT) of proximal vein of left lower extremity [I82.4Y2]     POSTOPERATIVE DIAGNOSIS: Post-Op Diagnosis Codes:     * Acute deep vein thrombosis (DVT) of proximal vein of left lower extremity [I82.4Y2]     PROCEDURE PERFORMED:   1.  Prone positioning  2.  Ultrasound-guided cannulation of the left popliteal vein  3.  Left lower extremity venogram with radiographic supervision and interpretation  4.  Mechanical thrombectomy of the left lower extremity common iliac, external iliac, common femoral, superficial femoral, and popliteal veins using the Penumbra 16 [place] lightning flash aspiration catheter  5.  Balloon occlusion of the distal superficial femoral vein using an 8 x 40 mm EverCross balloon  6.  Coil embolization of a distal superficial femoral vein sidebranch using an 8 x 12 mm and (2) 8 x 24 mm Terumo CX coils      SURGEON: Eleazar Rubio DO      ANESTHESIA: General     PREPARATION: Routine.     STAFF: Circulator: Annette Ho RN  Scrub Person: Harini Diego  Assistant: Mirtha Corley  Vascular Radiology Technician: Shameka Saravia  Vascular Ultrasound Technician: Ben Norton     Estimated Blood Loss: minimal     SPECIMENS: None     COMPLICATIONS: None     INDICATIONS: Angie Evans is a 49 y.o. female with past medical history of asthma, hypothyroidism and hypertension, who presented to the  emergency room with acute onset of left lower extremity swelling, pain, and redness.  She has admitted to immobilization recently with a broken toe.  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 In the emergency room, CTA of the abdomen and pelvis showed left lower extremity iliofemoral DVT.  CTA of the chest was negative for PE. She was given 1 dose of Lovenox in the emergency room. The indications, risks, and possible complications of the procedure were explained to the patient, who voiced understanding and wished to proceed with surgery.     PROCEDURE IN DETAIL: The patient was taken to the operating room and placed on the operating table in a prone position. After general anesthesia was obtained, the left lower extremity was prepped and draped in a sterile manner.  Under ultrasound guidance, and using a micropuncture technique, the left popliteal vein was cannulated and a microsheath was placed.  Advantage Glidewire was advanced up into the common femoral vein under fluoroscopic guidance.  A 6 [place] sheath was placed for predilatation.  Next the 17 [place] Penumbra short sheath was placed.  A venogram of the left lower extremity was performed which showed significant clot burden from the sheath cannulation site all the way up into the distal common iliac vein.  Next, mechanical thrombectomy was then performed of the common iliac, external iliac, common femoral, superficial femoral, and popliteal veins using the Penumbra 16 [place] lightning flash aspiration catheter.  A significant amount of clot burden was removed.  Completion venogram was performed which showed complete resolution of all the clot burden and return of flow to the left lower extremity.  There is a dual system for the superficial femoral vein that had clot in it.  This was cannulated with the advantage Glidewire and angled glide catheter.  The lightening flash catheter was passed into  this branch but a small perforation occurred.  An 8 x 40 mm EverCross balloon was used to occlude the takeoff while direct pressure was held on the skin to achieve hemostasis.  Next, the decision was made to place coils at the takeoff to occlude the sidebranch.  An 8 x 12 mm and  (2)  8 x 24 mm Terumo CX coils were used successfully.  At this point, I felt no further intervention was warranted.  The sheath was removed and direct pressure was held for an additional 10 to 15 minutes to ensure hemostasis.  The leg was wrapped with an Ace wrap and a knee immobilizer was placed. The patient tolerated the procedure well. Sponge and needle counts were correct. The patient was then awakened and extubated in the operating room and taken to the recovery room in good condition.     Eleazar Rubio,   Date: 2/27/2025          Time: 12:14 CST     CC:Erendira Finney APRN                 Basic Metabolic Panel [LAB15] (Order [id number])  Order  Date: 2/26/2025 Department: [address] Released By: Chester Medrano RN (auto-released) Authorizing: Emir Abad MD     Reprint Order Requisition    Basic Metabolic Panel (Order #[id number]) on 2/26/25         Contains abnormal data Basic Metabolic Panel  Order: [id number]  Status: Final result       Visible to patient: No (scheduled for 2/27/2025  6:53 AM)       Next appt: None    Specimen Information: Blood   0 Result Notes        Component  Ref Range & Units 04:28 1 d ago 3 yr ago   Glucose  65 - 99 mg/dL 105 High  104 High  115 High    BUN  6 - 20 mg/dL 7 8 7   Creatinine  0.57 - 1.00 mg/dL 0.65 0.73 0.93   Sodium  136 - 145 mmol/L 134 Low  132 Low  130 Low    Potassium  3.5 - 5.2 mmol/L 3.0 Low  3.0 Low  3.2 Low    Chloride  98 - 107 mmol/L 97 Low  94 Low  92 Low    CO2  22.0 - 29.0 mmol/L 23.0 22.0 27.0   Calcium  8.6 - 10.5 mg/dL 8.5 Low  8.8 8.3 Low    BUN/Creatinine Ratio  7.0 - 25.0 10.8 11.0 7.5   Anion Gap  5.0 - 15.0 mmol/L 14.0 16.0 High  11.0    eGFR  >60.0 mL/min/1.73 107.4 100.3             te/Time Temp Pulse Resp BP Patient Position Device (Oxygen Therapy) Flow (L/min) (Oxygen Therapy) SpO2   02/27/25 1336 98.2 (36.8) 80 16 108/57 -- room air -- 98   02/27/25 1326 -- 79 14 107/55 -- -- -- 97   02/27/25 1315 -- 84 16 104/58 -- room air -- 97   02/27/25 1306 -- 96 18 97/53 -- -- -- 97   02/27/25 1303 -- 86 16 102/52 -- -- -- 97   02/27/25 1259 -- 93 20 99/52 -- room air -- 99   02/27/25 1257 -- 93 18 95/54 -- -- -- 97   02/27/25 1254 -- 96 20 99/57 -- -- -- 97   02/27/25 1251 -- 102 20 102/55 -- -- -- 97   02/27/25 1248 -- 107 16 99/52 -- -- -- 97   02/27/25 1245 -- 107 18 96/57 -- -- -- 96   02/27/25 1242 -- 100 18 104/54 -- -- -- 100   02/27/25 1239 -- 99 20 101/55 -- -- -- 100   02/27/25 1236 -- 97 18 100/56 -- -- -- 99   02/27/                         Current Facility-Administered Medications   Medication Dose Route Frequency Provider Last Rate Last Admin    acetaminophen (TYLENOL) tablet 650 mg  650 mg Oral Q4H PREmir Mercado MD        Or    acetaminophen (TYLENOL) 160 MG/5ML oral solution 650 mg  650 mg Oral Q4H Emir Beasley MD        Or    acetaminophen (TYLENOL) suppository 650 mg  650 mg Rectal Q4H PREmir Mercado MD        acetaminophen (TYLENOL) tablet 650 mg  650 mg Oral Q8H Eleazar Rubio DO        Or    acetaminophen (TYLENOL) 160 MG/5ML oral solution 650 mg  650 mg Oral Q8H Eleazar Rubio DO        Or    acetaminophen (TYLENOL) suppository 650 mg  650 mg Rectal Q8H Eleazar Rubio DO        sennosides-docusate (PERICOLACE) 8.6-50 MG per tablet 2 tablet  2 tablet Oral BID PRN Emir Abad MD        And    polyethylene glycol (MIRALAX) packet 17 g  17 g Oral Daily PRN Emir Abad MD        And    bisacodyl (DULCOLAX) EC tablet 5 mg  5 mg Oral Daily PRN Emir Abad MD        And    bisacodyl (DULCOLAX) suppository 10 mg  10 mg Rectal Daily PRN Emir Abad MD         Calcium Replacement - Follow Nurse / BPA Driven Protocol   Not Applicable PRN Emir Abad MD        ceFAZolin 2000 mg IVPB in 100 mL NS (MBP)  2,000 mg Intravenous Q8H Eleazar Rubio DO        cetirizine (zyrTEC) tablet 5 mg  5 mg Oral Daily Emir Abad MD   5 mg at 02/27/25 0815    Enoxaparin Sodium (LOVENOX) syringe 120 mg  1 mg/kg Subcutaneous Q12H Emir Abad MD   120 mg at 02/27/25 0549    hydroCHLOROthiazide tablet 25 mg  25 mg Oral Daily Emir Abad MD   25 mg at 02/27/25 0815    HYDROcodone-acetaminophen (NORCO) 5-325 MG per tablet 1 tablet  1 tablet Oral Q6H PRN Emir Abad MD   1 tablet at 02/27/25 0548    lactated ringers infusion  100 mL/hr Intravenous Continuous [place]  mL/hr at 02/27/25 1102 Currently Infusing at 02/27/25 1102    losartan (COZAAR) tablet 50 mg  50 mg Oral Q24H Emir Abad MD   50 mg at 02/27/25 0815    Magnesium Low Dose Replacement - Follow Nurse / BPA Driven Protocol   Not Applicable PRN Emir Abad MD        Morphine sulfate (PF) injection 1 mg  1 mg Intravenous Q4H PRN Emir Abad MD        And    naloxone (NARCAN) injection 0.4 mg  0.4 mg Intravenous Q5 Min PRN Emir Abad MD        Morphine sulfate (PF) injection 2 mg  2 mg Intravenous Q2H PRN Eleazar Rubio DO        And    naloxone (NARCAN) injection 0.4 mg  0.4 mg Intravenous Q5 Min PRN Eleazar Rubio DO        ondansetron ODT (ZOFRAN-ODT) disintegrating tablet 4 mg  4 mg Oral Q6H PRN Eleazar Rubio DO        Or    ondansetron (ZOFRAN) injection 4 mg  4 mg Intravenous Q6H PRN Eleazar Rubio DO        Phosphorus Replacement - Follow Nurse / BPA Driven Protocol   Not Applicable PRN Emir Abad MD        potassium chloride (KLOR-CON M20) CR tablet 40 mEq  40 mEq Oral Q4H Raul Kathleen MD   40 mEq at 02/27/25 0815    potassium chloride (KLOR-CON) packet 10 mEq  10 mEq Oral BID Emir Abad  MD VOLODYMYR   10 mEq at 02/27/25 0815    Potassium Replacement - Follow Nurse / BPA Driven Protocol   Not Applicable PRN Emir Abad MD        sodium chloride 0.9 % flush 10 mL  10 mL Intravenous PRN Emir Abad MD        sodium chloride 0.9 % flush 10 mL  10 mL Intravenous Q12H Emir Abad MD   10 mL at 02/27/25 0815    sodium chloride 0.9 % flush 10 mL  10 mL Intravenous PRN Emir Abad MD        sodium chloride 0.9 % infusion 40 mL  40 mL Intravenous PRN Emir Abad MD        [Transfer Hold] thyroid (ARMOUR) tablet 90 mg  90 mg Oral Q AM Emir Abad MD   90 mg at 02/27/25 0600

## 2025-02-27 NOTE — PLAN OF CARE
Problem: Adult Inpatient Plan of Care  Goal: Plan of Care Review  Outcome: Progressing  Goal: Patient-Specific Goal (Individualized)  Outcome: Progressing  Goal: Absence of Hospital-Acquired Illness or Injury  Outcome: Progressing  Goal: Optimal Comfort and Wellbeing  Outcome: Progressing  Goal: Readiness for Transition of Care  Outcome: Progressing     Problem: Comorbidity Management  Goal: Maintenance of Asthma Control  Outcome: Progressing  Goal: Maintenance of Behavioral Health Symptom Control  Outcome: Progressing  Goal: Blood Pressure in Desired Range  Outcome: Progressing   Goal Outcome Evaluation:

## 2025-02-27 NOTE — OP NOTE
Angie Evans  2/27/2025     PREOPERATIVE DIAGNOSIS: Acute deep vein thrombosis (DVT) of proximal vein of left lower extremity [I82.4Y2]     POSTOPERATIVE DIAGNOSIS: Post-Op Diagnosis Codes:     * Acute deep vein thrombosis (DVT) of proximal vein of left lower extremity [I82.4Y2]     PROCEDURE PERFORMED:   1.  Prone positioning  2.  Ultrasound-guided cannulation of the left popliteal vein  3.  Left lower extremity venogram with radiographic supervision and interpretation  4.  Mechanical thrombectomy of the left lower extremity common iliac, external iliac, common femoral, superficial femoral, and popliteal veins using the Penumbra 16 [place] lightning flash aspiration catheter  5.  Balloon occlusion of the distal superficial femoral vein using an 8 x 40 mm EverCross balloon  6.  Coil embolization of a distal superficial femoral vein sidebranch using an 8 x 12 mm and (2) 8 x 24 mm Terumo CX coils      SURGEON: Eleazar Rubio DO      ANESTHESIA: General    PREPARATION: Routine.    STAFF: Circulator: Annette Ho RN  Scrub Person: Harini Diego  Assistant: Mirtha Corley  Vascular Radiology Technician: Shameka Saravia  Vascular Ultrasound Technician: Ben Norton    Estimated Blood Loss: minimal    SPECIMENS: None    COMPLICATIONS: None    INDICATIONS: Angie Evans is a 49 y.o. female with past medical history of asthma, hypothyroidism and hypertension, who presented to the emergency room with acute onset of left lower extremity swelling, pain, and redness.  She has admitted to immobilization recently with a broken toe.  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 In the emergency room, CTA of the abdomen and pelvis showed left lower extremity iliofemoral DVT.  CTA of the chest was negative for PE. She was given 1 dose of Lovenox in the emergency room. The indications, risks, and possible complications of the procedure were explained to  the patient, who voiced understanding and wished to proceed with surgery.     PROCEDURE IN DETAIL: The patient was taken to the operating room and placed on the operating table in a prone position. After general anesthesia was obtained, the left lower extremity was prepped and draped in a sterile manner.  Under ultrasound guidance, and using a micropuncture technique, the left popliteal vein was cannulated and a microsheath was placed.  Advantage Glidewire was advanced up into the common femoral vein under fluoroscopic guidance.  A 6 [place]ian sheath was placed for predilatation.  Next the 17 [place] Penumbra short sheath was placed.  A venogram of the left lower extremity was performed which showed significant clot burden from the sheath cannulation site all the way up into the distal common iliac vein.  Next, mechanical thrombectomy was then performed of the common iliac, external iliac, common femoral, superficial femoral, and popliteal veins using the Penumbra 16 [place] lightning flash aspiration catheter.  A significant amount of clot burden was removed.  Completion venogram was performed which showed complete resolution of all the clot burden and return of flow to the left lower extremity.  There is a dual system for the superficial femoral vein that had clot in it.  This was cannulated with the advantage Glidewire and angled glide catheter.  The lightening flash catheter was passed into this branch but a small perforation occurred.  An 8 x 40 mm EverCross balloon was used to occlude the takeoff while direct pressure was held on the skin to achieve hemostasis.  Next, the decision was made to place coils at the takeoff to occlude the sidebranch.  An 8 x 12 mm and  (2)  8 x 24 mm Terumo CX coils were used successfully.  At this point, I felt no further intervention was warranted.  The sheath was removed and direct pressure was held for an additional 10 to 15 minutes to ensure hemostasis.  The leg was wrapped with an  Ace wrap and a knee immobilizer was placed. The patient tolerated the procedure well. Sponge and needle counts were correct. The patient was then awakened and extubated in the operating room and taken to the recovery room in good condition.    Eleazar Rubio, DO  Date: 2/27/2025 Time: 12:14 CST    CC:Erendira Finney, APRN

## 2025-02-28 VITALS
RESPIRATION RATE: 14 BRPM | DIASTOLIC BLOOD PRESSURE: 63 MMHG | SYSTOLIC BLOOD PRESSURE: 125 MMHG | TEMPERATURE: 97.9 F | HEIGHT: 68 IN | BODY MASS INDEX: 38.34 KG/M2 | OXYGEN SATURATION: 100 % | HEART RATE: 80 BPM | WEIGHT: 253 LBS

## 2025-02-28 LAB
ANION GAP SERPL CALCULATED.3IONS-SCNC: 11 MMOL/L (ref 5–15)
BUN SERPL-MCNC: 8 MG/DL (ref 6–20)
BUN/CREAT SERPL: 10.7 (ref 7–25)
CALCIUM SPEC-SCNC: 8.6 MG/DL (ref 8.6–10.5)
CHLORIDE SERPL-SCNC: 100 MMOL/L (ref 98–107)
CO2 SERPL-SCNC: 24 MMOL/L (ref 22–29)
CREAT SERPL-MCNC: 0.75 MG/DL (ref 0.57–1)
DEPRECATED RDW RBC AUTO: 46.8 FL (ref 37–54)
EGFRCR SERPLBLD CKD-EPI 2021: 97.1 ML/MIN/1.73
ERYTHROCYTE [DISTWIDTH] IN BLOOD BY AUTOMATED COUNT: 14.5 % (ref 12.3–15.4)
GLUCOSE SERPL-MCNC: 140 MG/DL (ref 65–99)
HCT VFR BLD AUTO: 31.3 % (ref 34–46.6)
HGB BLD-MCNC: 10.8 G/DL (ref 12–15.9)
MCH RBC QN AUTO: 30.9 PG (ref 26.6–33)
MCHC RBC AUTO-ENTMCNC: 34.5 G/DL (ref 31.5–35.7)
MCV RBC AUTO: 89.4 FL (ref 79–97)
PLATELET # BLD AUTO: 254 10*3/MM3 (ref 140–450)
PMV BLD AUTO: 9.3 FL (ref 6–12)
POTASSIUM SERPL-SCNC: 3.2 MMOL/L (ref 3.5–5.2)
RBC # BLD AUTO: 3.5 10*6/MM3 (ref 3.77–5.28)
SODIUM SERPL-SCNC: 135 MMOL/L (ref 136–145)
WBC NRBC COR # BLD AUTO: 11.45 10*3/MM3 (ref 3.4–10.8)

## 2025-02-28 PROCEDURE — 80048 BASIC METABOLIC PNL TOTAL CA: CPT | Performed by: INTERNAL MEDICINE

## 2025-02-28 PROCEDURE — 25010000002 CEFAZOLIN PER 500 MG: Performed by: SURGERY

## 2025-02-28 PROCEDURE — 99232 SBSQ HOSP IP/OBS MODERATE 35: CPT | Performed by: NURSE PRACTITIONER

## 2025-02-28 PROCEDURE — 25010000002 ENOXAPARIN PER 10 MG: Performed by: SURGERY

## 2025-02-28 PROCEDURE — 85027 COMPLETE CBC AUTOMATED: CPT | Performed by: INTERNAL MEDICINE

## 2025-02-28 RX ORDER — HYDROCODONE BITARTRATE AND ACETAMINOPHEN 5; 325 MG/1; MG/1
1 TABLET ORAL EVERY 6 HOURS PRN
Qty: 5 TABLET | Refills: 0 | Status: SHIPPED | OUTPATIENT
Start: 2025-02-28

## 2025-02-28 RX ORDER — POTASSIUM CHLORIDE 750 MG/1
40 CAPSULE, EXTENDED RELEASE ORAL EVERY 4 HOURS
Status: DISCONTINUED | OUTPATIENT
Start: 2025-02-28 | End: 2025-03-01 | Stop reason: HOSPADM

## 2025-02-28 RX ADMIN — THYROID, PORCINE 90 MG: 60 TABLET ORAL at 05:24

## 2025-02-28 RX ADMIN — POTASSIUM CHLORIDE 40 MEQ: 750 CAPSULE, EXTENDED RELEASE ORAL at 18:14

## 2025-02-28 RX ADMIN — ACETAMINOPHEN 650 MG: 325 TABLET ORAL at 05:24

## 2025-02-28 RX ADMIN — Medication 10 ML: at 08:35

## 2025-02-28 RX ADMIN — CETIRIZINE HYDROCHLORIDE 5 MG: 10 TABLET, FILM COATED ORAL at 08:35

## 2025-02-28 RX ADMIN — LOSARTAN POTASSIUM 50 MG: 50 TABLET, FILM COATED ORAL at 08:35

## 2025-02-28 RX ADMIN — HYDROCHLOROTHIAZIDE 25 MG: 25 TABLET ORAL at 08:35

## 2025-02-28 RX ADMIN — POTASSIUM CHLORIDE 10 MEQ: 1.5 POWDER, FOR SOLUTION ORAL at 08:35

## 2025-02-28 RX ADMIN — CEFAZOLIN 2000 MG: 2 INJECTION, POWDER, FOR SOLUTION INTRAMUSCULAR; INTRAVENOUS at 03:12

## 2025-02-28 RX ADMIN — ENOXAPARIN SODIUM 120 MG: 120 INJECTION SUBCUTANEOUS at 05:24

## 2025-02-28 RX ADMIN — APIXABAN 5 MG: 5 TABLET, FILM COATED ORAL at 18:14

## 2025-02-28 RX ADMIN — ACETAMINOPHEN 650 MG: 325 TABLET ORAL at 14:06

## 2025-02-28 RX ADMIN — HYDROCODONE BITARTRATE AND ACETAMINOPHEN 1 TABLET: 5; 325 TABLET ORAL at 03:12

## 2025-02-28 NOTE — PLAN OF CARE
Problem: Adult Inpatient Plan of Care  Goal: Plan of Care Review  Outcome: Progressing  Flowsheets (Taken 2/28/2025 9933)  Progress: improving  Outcome Evaluation: Pt a/o x4. VSS. Telemetry NSR 62-92. Maintaining SpO2 on RA. LLE dressing CDI. +2 pulse to affected extremity. Pain treated with regularly scheduled tylenol and prn norco x1. Pt has rested off and on.  at bedside overnight.  Plan of Care Reviewed With: patient

## 2025-02-28 NOTE — DISCHARGE SUMMARY
[facility] Medicine Services  DISCHARGE SUMMARY       Date of Admission: 2/26/2025  Date of Discharge:  2/28/2025  Primary Care Physician: Erendira Finney APRN    Presenting Problem/History of Present Illness:  Left lower extremity swelling, pain and redness  Final Discharge Diagnoses:  Acute left lower extremity DVT status post mechanical thrombectomy of the left lower extremity common iliac, external iliac, common femoral, superficial femoral, and popliteal veins using the Penumbra 16 Romansh lightning flash aspiration catheter (February 27);   -Other chronic medical conditions-  Hypothyroidism (euthyroid)  Hypertension   Hypokalemia  Asthma-not in exacerbation  Obesity with BMI of 38.5      Consults:   Vascular    Procedures Performed:   PREOPERATIVE DIAGNOSIS: Acute deep vein thrombosis (DVT) of proximal vein of left lower extremity [I82.4Y2]     POSTOPERATIVE DIAGNOSIS: Post-Op Diagnosis Codes:     * Acute deep vein thrombosis (DVT) of proximal vein of left lower extremity [I82.4Y2]     PROCEDURE PERFORMED:   1.  Prone positioning  2.  Ultrasound-guided cannulation of the left popliteal vein  3.  Left lower extremity venogram with radiographic supervision and interpretation  4.  Mechanical thrombectomy of the left lower extremity common iliac, external iliac, common femoral, superficial femoral, and popliteal veins using the Penumbra 16 Romansh lightning flash aspiration catheter  5.  Balloon occlusion of the distal superficial femoral vein using an 8 x 40 mm EverCross balloon  6.  Coil embolization of a distal superficial femoral vein sidebranch using an 8 x 12 mm and (2) 8 x 24 mm Terumo CX coils      SURGEON: Eleazar Rubio,    Pertinent Test Results:       Imaging Results (All)       Procedure Component Value Units Date/Time    IR Venogram Extremity [[id number]] Collected: 02/28/25 0636     Updated: 02/28/25 0636    Narrative:      Performed by Dr. Rubio.  Please see procedure note.       US Venous Doppler Lower Extremity Left (duplex) [[id number]] Collected: 02/27/25 1659     Updated: 02/27/25 1705    Narrative:      History: Swelling       Impression:      Impression: DVT noted in the left common femoral vein, saphenofemoral  junction, superficial femoral vein, popliteal vein, and tibial veins.     Comments: Left lower extremity venous duplex exam was performed using  color Doppler flow, Doppler wave form analysis, and grayscale imaging,  with and without compression. There is occlusive thrombus of the left  common femoral vein. This extends down through the popliteal vein and  into the tibial veins.        This report was signed and finalized on 2/27/2025 5:02 PM by Arthur Rose.       FL C Arm During Surgery [[id number]] Resulted: 02/27/25 1229     Updated: 02/27/25 1229    Narrative:      This procedure was auto-finalized with no dictation required.    US Guided Vascular Access [[id number]] Resulted: 02/27/25 1134     Updated: 02/27/25 1144    CT Angiogram Abdomen Pelvis [[id number]] Collected: 02/26/25 2041     Updated: 02/26/25 2048    Narrative:      EXAM: CT ANGIOGRAM ABDOMEN PELVIS- - 2/26/2025 7:15 PM     HISTORY: venous phasing for dvt- ordered by vascular; I82.4Y2-Acute  embolism and thrombosis of unspecified deep veins of left proximal lower  extremity       COMPARISON: None available.     Automatic exposure control was utilized to make radiation dose as low as  reasonably achievable.     TECHNIQUE: Enhanced axial images of the abdomen and pelvis obtained with  multiplanar reformats. 3D postprocessing, including MIPs, performed and  images saved to PACS.     FINDINGS:  VISUALIZED CHEST: No pericardial or pleural effusion is seen. Lung bases  are grossly clear.     LIVER/BILIARY: Hepatic parenchyma is unremarkable. Patient is status  post cholecystectomy. Bile ducts are unremarkable.     KIDNEYS/URETERS: Bilateral kidneys and ureters are unremarkable.  There  is no hydronephrosis.     ADRENAL: Unremarkable.     SPLEEN: Unremarkable.     PANCREAS: Unremarkable.        PERITONEUM/RETROPERITONEUM: No free air, ascites, or lymphadenopathy.     GI TRACT: There is no bowel obstruction or mass.     PELVIS: Uterus and adnexa are unremarkable. Urinary bladder is  incompletely distended. No free fluid or lymphadenopathy is seen.     SOFT TISSUES: There is edema in the left inguinal soft tissues and  subcutaneous soft tissues of the proximal left lower extremity.     BONES: No acute or aggressive bony lesion.            VESSELS:     AORTA/ABDOMINAL-PELVIC VESSELS: Abdominal aorta is unremarkable. No  aneurysm, stenosis, or occlusion is seen. Bilateral common iliac  arteries and bilateral internal and external iliac arteries are patent  without stenosis or occlusion.     There is enlargement of the proximal left external iliac vein with  filling defects consistent with deep venous thrombosis extends  anteriorly to the left common femoral vein and left superficial femoral  vein.             Impression:      1. Deep venous thrombosis on the left involving the external iliac vein,  common femoral vein, and superficial femoral vein and associated edema  in the subcutaneous soft tissues of the proximal left lower extremity  and left inguinal soft tissues.     This report was signed and finalized on 2/26/2025 8:45 PM by Giovanni Hernandez.       CT Angiogram Chest [[id number]] Collected: 02/26/25 2038     Updated: 02/26/25 2044    Narrative:      EXAM: CT ANGIOGRAM CHEST-      DATE: 2/26/2025 7:15 PM     HISTORY: Lower extremity DVT     COMPARISON: None available.     Automatic exposure control was utilized to make radiation dose as low as  reasonably achievable.     TECHNIQUE: Enhanced CT images of the chest obtained with multiplanar  reformats.     FINDINGS:     MEDIASTINUM/EXTRATHORACIC:   Thoracic aorta is unremarkable. There is no  significant coronary artery calcification. No  pericardial or pleural  effusion is identified. No thoracic lymphadenopathy is seen.     PULMONARY ARTERIES: Pulmonary arteries are opacified and no filling  defects are seen.     LUNGS/AIRWAYS: There is mild scarring at the superior segment of the  left lower lobe. No pneumonia is seen. Airways are unremarkable.        INCLUDED UPPER ABDOMEN: The visualized portions of the upper abdomen are  within normal limits. There is a small hiatal hernia.     SOFT TISSUES: Submitted soft tissues of the chest are unremarkable.     BONES: No suspicious osseous lesions identified.       Impression:      1. No pulmonary embolism identified or active disease in the chest.     This report was signed and finalized on 2/26/2025 8:41 PM by Giovanni Hernandez.             LAB RESULTS:      Lab 02/28/25  1404 02/26/25  1755   WBC 11.45* 12.59*   HEMOGLOBIN 10.8* 13.6   HEMATOCRIT 31.3* 37.6   PLATELETS 254 263   NEUTROS ABS  --  10.29*   IMMATURE GRANS (ABS)  --  0.04   LYMPHS ABS  --  1.55   MONOS ABS  --  0.54   EOS ABS  --  0.13   MCV 89.4 86.8   PROTIME  --  13.6         Lab 02/28/25  1404 02/27/25  1933 02/27/25  0428 02/26/25  1755   SODIUM 135*  --  134* 132*   POTASSIUM 3.2* 3.6 3.0* 3.0*   CHLORIDE 100  --  97* 94*   CO2 24.0  --  23.0 22.0   ANION GAP 11.0  --  14.0 16.0*   BUN 8  --  7 8   CREATININE 0.75  --  0.65 0.73   EGFR 97.1  --  107.4 100.3   GLUCOSE 140*  --  105* 104*   CALCIUM 8.6  --  8.5* 8.8   MAGNESIUM  --   --  2.1 1.9   TSH  --   --  2.720  --          Lab 02/26/25  1755   TOTAL PROTEIN 7.4   ALBUMIN 3.8   GLOBULIN 3.6   ALT (SGPT) 20   AST (SGOT) 20   BILIRUBIN 0.9   ALK PHOS 92         Lab 02/26/25  1755   PROTIME 13.6   INR 0.99             Lab 02/27/25  1012   ABO TYPING A   RH TYPING Negative   ANTIBODY SCREEN Negative         Brief Urine Lab Results  (Last result in the past 365 days)        Color   Clarity   Blood   Leuk Est   Nitrite   Protein   CREAT   Urine HCG        02/26/25 2052                "Negative             Microbiology Results (last 10 days)       ** No results found for the last 240 hours. **            Hospital Course:   49-year-old woman who was admitted for  left lower extremity swelling pain and redness and been found on CT of the abdomen and pelvis to have left lower extremity DVT prompting further evaluation with CTA of chest which was negative for PE.  Patient was started on anticoagulation.     She was taken for surgery by Dr. Rubio today  PREOPERATIVE DIAGNOSIS: Acute deep vein thrombosis (DVT) of proximal vein of left lower extremity [I82.4Y2]     POSTOPERATIVE DIAGNOSIS: Post-Op Diagnosis Codes:     * Acute deep vein thrombosis (DVT) of proximal vein of left lower extremity [I82.4Y2]     PROCEDURE PERFORMED:   1.  Prone positioning  2.  Ultrasound-guided cannulation of the left popliteal vein  3.  Left lower extremity venogram with radiographic supervision and interpretation  4.  Mechanical thrombectomy of the left lower extremity common iliac, external iliac, common femoral, superficial femoral, and popliteal veins using the Penumbra 16 [place] lightning flash aspiration catheter  5.  Balloon occlusion of the distal superficial femoral vein using an 8 x 40 mm EverCross balloon  6.  Coil embolization of a distal superficial femoral vein sidebranch using an 8 x 12 mm and (2) 8 x 24 mm Terumo CX coils      SURGEON: Eleazar Rubio DO         Noted patient has hypokalemia.  Patient been on potassium replacement.  Noted patient also on hydrochlorothiazide     Patient doing well postoperatively  Patient has been transitioned to DOAC.  No loading needed as per discussion with Dr. Rubio.  Questions were answered to the best of my abilities.    Physical Exam on Discharge:  /63 (BP Location: Left arm, Patient Position: Lying)   Pulse 80   Temp 97.9 °F (36.6 °C) (Oral)   Resp 14   Ht 172.7 cm (68\")   Wt 115 kg (253 lb)   SpO2 100%   BMI 38.47 kg/m²   Physical Exam  Extremely " anxious at the time of visit which after visit by NP Hansa felt reassured and now would wish to go home.  Flush facial skin  GEN: Awake, alert, interactive, in NAD  HEENT: Atraumatic, PERRLA, EOMI, Anicteric, Trachea midline  Lungs: CTAB, no wheezing/rales/rhonchi  Heart: RRR, +S1/s2, no rub  ABD: soft, nt/nd, +BS, no guarding/rebound  Extremities: atraumatic, no cyanosis, wrapped with bandage on the left lower extremity skin: no rashes or lesions  Neuro: AAOx3, no focal deficits  Psych: Extremely anxious.  Condition on Discharge: Stable    Discharge Disposition:  Home or Self Care    Discharge Medications:     Discharge Medications        New Medications        Instructions Start Date   apixaban 5 MG tablet tablet  Commonly known as: ELIQUIS   5 mg, Oral, Every 12 Hours Scheduled      HYDROcodone-acetaminophen 5-325 MG per tablet  Commonly known as: NORCO   1 tablet, Oral, Every 6 Hours PRN             Continue These Medications        Instructions Start Date   albuterol sulfate  (90 Base) MCG/ACT inhaler  Commonly known as: PROVENTIL HFA;VENTOLIN HFA;PROAIR HFA   2 puffs, Inhalation, Every 4 Hours PRN      cetirizine 5 MG tablet  Commonly known as: zyrTEC   5 mg, Oral, Daily      losartan 50 MG tablet  Commonly known as: COZAAR   50 mg, Oral, 2 Times Daily      nystatin [id number] UNIT/GM ointment  Commonly known as: MYCOSTATIN   1 Application, 2 Times Daily      Thyroid 90 MG tablet  Commonly known as: ARMOUR   90 mg, Oral, Daily             Stop These Medications      hydroCHLOROthiazide 25 MG tablet     potassium chloride 10 MEQ CR capsule  Commonly known as: MICRO-K          Due to patient's hypokalemia, I discontinued hydrochlorothiazide.  I recommend monitoring of blood pressure and follow closely with primary care provider.  We gave him supplemental potassium.      This patient has current or prior documentation of an left ventricular ejection fraction (LVEF) of less than or equal to 40%.-Not  applicable.    Discharge Diet:   Diet Instructions       Diet: Regular/House Diet; Regular (IDDSI 7); Thin (IDDSI 0)      Discharge Diet: Regular/House Diet    Texture: Regular (IDDSI 7)    Fluid Consistency: Thin (IDDSI 0)            Activity at Discharge:   Activity Instructions       Gradually Increase Activity Until at Pre-Hospitalization Level              Follow-up Appointments:   PCP within 1 week  Vascular surgery as per their recommendation    Test Results Pending at Discharge: None    Electronically signed by Raul Kathleen MD, 02/28/25, 17:44 CST.    Time: Greater than 30 minutes.

## 2025-02-28 NOTE — PROGRESS NOTES
LOS: 2 days   Patient Care Team:  Erendira Finney APRN as PCP - General (Nurse Practitioner)    Chief Complaint:  left leg swelling    Subjective     Patient seen/examined with complaints of discomfort.  Left leg is swollen but much less tight than previous.  Popliteal dressing removed Steri-Strips intact.  AVSS.  Afebrile.  Potassium 3.2.    Objective     Vital Signs  Temp:  [97.5 °F (36.4 °C)-99.3 °F (37.4 °C)] 97.9 °F (36.6 °C)  Heart Rate:  [78-97] 80  Resp:  [14-18] 14  BP: (110-134)/(58-72) 125/63    Physical Exam  Vitals and nursing note reviewed.   Constitutional:       General: She is not in acute distress.     Appearance: She is well-developed. She is not diaphoretic.   HENT:      Head: Normocephalic and atraumatic.   Eyes:      General: No scleral icterus.     Pupils: Pupils are equal, round, and reactive to light.   Neck:      Thyroid: No thyromegaly.      Vascular: No carotid bruit or JVD.   Cardiovascular:      Rate and Rhythm: Normal rate and regular rhythm.      Pulses: Normal pulses.      Heart sounds: Normal heart sounds and S2 normal. No murmur heard.     No friction rub. No gallop.   Pulmonary:      Effort: Pulmonary effort is normal.      Breath sounds: Normal breath sounds.   Abdominal:      General: Bowel sounds are normal.      Palpations: Abdomen is soft.   Musculoskeletal:         General: Normal range of motion.      Cervical back: Normal range of motion and neck supple.      Left lower leg: Edema present.      Comments: Left leg swelling but improved from yesterday Steri-Strips to popliteal area intact.  Leg rewrapped prior to discharge   Skin:     General: Skin is warm and dry.   Neurological:      Mental Status: She is alert and oriented to person, place, and time.      Cranial Nerves: No cranial nerve deficit.   Psychiatric:         Mood and Affect: Mood is anxious.         Behavior: Behavior normal.         Thought Content: Thought content normal.         Judgment: Judgment  normal.         Laboratory Data:   Results from last 7 days   Lab Units 02/28/25  1404 02/26/25  1755   WBC 10*3/mm3 11.45* 12.59*   HEMOGLOBIN g/dL 10.8* 13.6   HEMATOCRIT % 31.3* 37.6   PLATELETS 10*3/mm3 254 263       Results from last 7 days   Lab Units 02/28/25  1404 02/27/25  1933 02/27/25  0428 02/26/25  1755   SODIUM mmol/L 135*  --  134* 132*   POTASSIUM mmol/L 3.2* 3.6 3.0* 3.0*   CHLORIDE mmol/L 100  --  97* 94*   CO2 mmol/L 24.0  --  23.0 22.0   BUN mg/dL 8  --  7 8   CREATININE mg/dL 0.75  --  0.65 0.73   CALCIUM mg/dL 8.6  --  8.5* 8.8   BILIRUBIN mg/dL  --   --   --  0.9   ALK PHOS U/L  --   --   --  92   ALT (SGPT) U/L  --   --   --  20   AST (SGOT) U/L  --   --   --  20   GLUCOSE mg/dL 140*  --  105* 104*     Results from last 7 days   Lab Units 02/26/25  1755   PROTIME Seconds 13.6   INR  0.99            Medication Review: reviewed    Assessment & Plan       Acute deep vein thrombosis (DVT) of left iliofemoral vein  Status post DVT thrombectomy day #1    Plan:  Ok for discharge.   May remove ACE wrap.  Compression stocking at discharge  Follow up with me 2 weeks.   Ok to switch to oral anticoagulation    EVER Ham  Vascular Surgery  [phone]  02/28/25  15:55 CST

## 2025-03-01 NOTE — PAYOR COMM NOTE
"[place] HOME 2-28-25    Angie Evans (49 y.o. Female)       Date of Birth   1975    Social Security Number       Address   [address]  [place] [address]    Home Phone   [phone]    MRN   [id number]       Restoration   Non-Amish    Marital Status                               Admission Date   2/26/25    Admission Type   Emergency    Admitting Provider   Raul Kathleen MD    Attending Provider       Department, Room/Bed   [address], 450/1       Discharge Date   2/28/2025    Discharge Disposition   Home or Self Care    Discharge Destination   Home                              Attending Provider: (none)   Allergies: Zithromax [Azithromycin]    Isolation: None   Infection: None   Code Status: Prior    Ht: 172.7 cm (68\")   Wt: 115 kg (253 lb)    Admission Cmt: None   Principal Problem: None                  Active Insurance as of 2/26/2025       Primary Coverage       Payor Plan Insurance Group Employer/Plan Group    [employer]HEM BLUE CROSS ANTHEM BLUE CROSS BLUE SHIELD PPO [id number]       Payor Plan Address Payor Plan Phone Number Payor Plan Fax Number Effective Dates    [address] [phone]  8/1/2021 - None Entered    [facility] [id number]         Subscriber Name Subscriber Birth Date Member ID       CYNTHIA EVANS P 6[birth year]1/1976 [id number]                     Emergency Contacts        (Rel.) Home Phone Work Phone Mobile Phone    SABRINA CHAMBERS (Mother) -- -- [phone]    Janna Evans (Daughter) -- -- [phone]    Cynthia Evans (Spouse) [phone] -- [phone]                 Physician Progress Notes (all)        Raul Kathleen MD at 02/27/25 1600          1         [facility] Medicine Services  INPATIENT PROGRESS NOTE    Patient Name: Angie Evans  Date of Admission: 2/26/2025  Today's Date: 02/27/25  Length of Stay: 1  Primary Care Physician: Erendira Finney APRN    Subjective   Chief " Complaint: Follow-up  HPI   Admitted yesterday for left lower extremity swelling pain and redness and been found on CT of the abdomen and pelvis to have left lower extremity DVT prompting further evaluation with CTA of chest which was negative for PE.  Patient was started on anticoagulation.    She was taken for surgery by Dr. Rubio today  PREOPERATIVE DIAGNOSIS: Acute deep vein thrombosis (DVT) of proximal vein of left lower extremity [I82.4Y2]     POSTOPERATIVE DIAGNOSIS: Post-Op Diagnosis Codes:     * Acute deep vein thrombosis (DVT) of proximal vein of left lower extremity [I82.4Y2]     PROCEDURE PERFORMED:   1.  Prone positioning  2.  Ultrasound-guided cannulation of the left popliteal vein  3.  Left lower extremity venogram with radiographic supervision and interpretation  4.  Mechanical thrombectomy of the left lower extremity common iliac, external iliac, common femoral, superficial femoral, and popliteal veins using the Penumbra 16 [place] lightning flash aspiration catheter  5.  Balloon occlusion of the distal superficial femoral vein using an 8 x 40 mm EverCross balloon  6.  Coil embolization of a distal superficial femoral vein sidebranch using an 8 x 12 mm and (2) 8 x 24 mm Terumo CX coils      SURGEON: Eleazar Rubio DO        Noted patient has hypokalemia.  Patient been on potassium replacement.  Noted patient also on hydrochlorothiazide    Patient doing well postoperatively  Review of Systems   All pertinent negatives and positives are as above. All other systems have been reviewed and are negative unless otherwise stated.     Objective    Temp:  [97.7 °F (36.5 °C)-98.8 °F (37.1 °C)] 98.2 °F (36.8 °C)  Heart Rate:  [] 80  Resp:  [14-22] 16  BP: ()/(52-89) 108/57  Physical Exam  Flat in bed  No distress  Normal respiratory effort  Alert oriented x 3  Grossly nonfocal exam      Results Review:  I have reviewed the labs, radiology results, and diagnostic studies.    Laboratory Data:  "  Results from last 7 days   Lab Units 02/26/25  1755   WBC 10*3/mm3 12.59*   HEMOGLOBIN g/dL 13.6   HEMATOCRIT % 37.6   PLATELETS 10*3/mm3 263        Results from last 7 days   Lab Units 02/27/25  0428 02/26/25  1755   SODIUM mmol/L 134* 132*   POTASSIUM mmol/L 3.0* 3.0*   CHLORIDE mmol/L 97* 94*   CO2 mmol/L 23.0 22.0   BUN mg/dL 7 8   CREATININE mg/dL 0.65 0.73   CALCIUM mg/dL 8.5* 8.8   BILIRUBIN mg/dL  --  0.9   ALK PHOS U/L  --  92   ALT (SGPT) U/L  --  20   AST (SGOT) U/L  --  20   GLUCOSE mg/dL 105* 104*       Culture Data:   No results found for: \"BLOODCX\", \"URINECX\", \"WOUNDCX\", \"MRSACX\", \"RESPCX\", \"STOOLCX\"    Radiology Data:   Imaging Results (Last 24 Hours)       Procedure Component Value Units Date/Time    FL C Arm During Surgery [[id number]] Resulted: 02/27/25 1229     Updated: 02/27/25 1229    Narrative:      This procedure was auto-finalized with no dictation required.    IR Venogram Extremity [[id number]] Resulted: 02/27/25 1054     Updated: 02/27/25 1229    US Venous Doppler Lower Extremity Left (duplex) [[id number]] Resulted: 02/26/25 1824     Updated: 02/27/25 1144    US Guided Vascular Access [[id number]] Resulted: 02/27/25 1134     Updated: 02/27/25 1144    CT Angiogram Abdomen Pelvis [[id number]] Collected: 02/26/25 2041     Updated: 02/26/25 2048    Narrative:      EXAM: CT ANGIOGRAM ABDOMEN PELVIS- - 2/26/2025 7:15 PM     HISTORY: venous phasing for dvt- ordered by vascular; I82.4Y2-Acute  embolism and thrombosis of unspecified deep veins of left proximal lower  extremity       COMPARISON: None available.     Automatic exposure control was utilized to make radiation dose as low as  reasonably achievable.     TECHNIQUE: Enhanced axial images of the abdomen and pelvis obtained with  multiplanar reformats. 3D postprocessing, including MIPs, performed and  images saved to PACS.     FINDINGS:  VISUALIZED CHEST: No pericardial or pleural effusion is seen. Lung bases  are grossly clear.   "   LIVER/BILIARY: Hepatic parenchyma is unremarkable. Patient is status  post cholecystectomy. Bile ducts are unremarkable.     KIDNEYS/URETERS: Bilateral kidneys and ureters are unremarkable. There  is no hydronephrosis.     ADRENAL: Unremarkable.     SPLEEN: Unremarkable.     PANCREAS: Unremarkable.        PERITONEUM/RETROPERITONEUM: No free air, ascites, or lymphadenopathy.     GI TRACT: There is no bowel obstruction or mass.     PELVIS: Uterus and adnexa are unremarkable. Urinary bladder is  incompletely distended. No free fluid or lymphadenopathy is seen.     SOFT TISSUES: There is edema in the left inguinal soft tissues and  subcutaneous soft tissues of the proximal left lower extremity.     BONES: No acute or aggressive bony lesion.            VESSELS:     AORTA/ABDOMINAL-PELVIC VESSELS: Abdominal aorta is unremarkable. No  aneurysm, stenosis, or occlusion is seen. Bilateral common iliac  arteries and bilateral internal and external iliac arteries are patent  without stenosis or occlusion.     There is enlargement of the proximal left external iliac vein with  filling defects consistent with deep venous thrombosis extends  anteriorly to the left common femoral vein and left superficial femoral  vein.             Impression:      1. Deep venous thrombosis on the left involving the external iliac vein,  common femoral vein, and superficial femoral vein and associated edema  in the subcutaneous soft tissues of the proximal left lower extremity  and left inguinal soft tissues.     This report was signed and finalized on 2/26/2025 8:45 PM by Giovanni Hernandez.       CT Angiogram Chest [[id number]] Collected: 02/26/25 2038     Updated: 02/26/25 2044    Narrative:      EXAM: CT ANGIOGRAM CHEST-      DATE: 2/26/2025 7:15 PM     HISTORY: Lower extremity DVT     COMPARISON: None available.     Automatic exposure control was utilized to make radiation dose as low as  reasonably achievable.     TECHNIQUE: Enhanced CT  images of the chest obtained with multiplanar  reformats.     FINDINGS:     MEDIASTINUM/EXTRATHORACIC:   Thoracic aorta is unremarkable. There is no  significant coronary artery calcification. No pericardial or pleural  effusion is identified. No thoracic lymphadenopathy is seen.     PULMONARY ARTERIES: Pulmonary arteries are opacified and no filling  defects are seen.     LUNGS/AIRWAYS: There is mild scarring at the superior segment of the  left lower lobe. No pneumonia is seen. Airways are unremarkable.        INCLUDED UPPER ABDOMEN: The visualized portions of the upper abdomen are  within normal limits. There is a small hiatal hernia.     SOFT TISSUES: Submitted soft tissues of the chest are unremarkable.     BONES: No suspicious osseous lesions identified.       Impression:      1. No pulmonary embolism identified or active disease in the chest.     This report was signed and finalized on 2/26/2025 8:41 PM by Giovanni Hernandez.               I have reviewed the patient's current medications.     Assessment/Plan   Assessment  Active Hospital Problems    Diagnosis     Acute deep vein thrombosis (DVT) of left iliofemoral vein                Medical Decision Making  Number and Complexity of problems:     Acute left lower extremity DVT status post mechanical thrombectomy of the left lower extremity common iliac, external iliac, common femoral, superficial femoral, and popliteal veins using the Penumbra 16 [place] lightning flash aspiration catheter (February 27); currently on anticoagulation  -Other chronic medical conditions-  Hypothyroidism (euthyroid)  Hypertension continue care losartan.  Noted hydrochlorothiazide.  Patient received replacement protocol for hypokalemia.  Will reassess use of medication in the setting.  Asthma  Hypokalemia-replace per protocol and follow-up; normal magnesium      Medications reviewed  acetaminophen, 650 mg, Oral, Q8H   Or  acetaminophen, 650 mg, Oral, Q8H   Or  acetaminophen, 650  mg, Rectal, Q8H  ceFAZolin, 2,000 mg, Intravenous, Q8H  cetirizine, 5 mg, Oral, Daily  enoxaparin, 1 mg/kg, Subcutaneous, Q12H  hydroCHLOROthiazide, 25 mg, Oral, Daily  losartan, 50 mg, Oral, Q24H  potassium chloride ER, 40 mEq, Oral, Q4H  potassium chloride, 10 mEq, Oral, BID  sodium chloride, 10 mL, Intravenous, Q12H  [Transfer Hold] Thyroid, 90 mg, Oral, Q AM        Conditions and Status  Fair  MDM Data  External documents reviewed: Reviewed  Cardiac tracing (EKG, telemetry) interpretation: --  Radiology interpretation: Reviewed  Labs reviewed: Yes  Any tests that were considered but not ordered: None     Decision rules/scores evaluated (example [id number], Wells, etc): None     Discussed with: Patient and mother.     Care Planning  Shared decision making: Patient and consultant  Code status and discussions: Full code    Disposition  Social Determinants of Health that impact treatment or disposition: Identified at this time  I expect the patient to be discharged to (TBD)            Electronically signed by Raul Kathleen MD, 02/27/25, 16:00 CST.      Electronically signed by Raul Kathleen MD at 02/27/25 1626          Consult Notes (all)        Eleazar Rubio DO at 02/27/25 1100        Consult Orders    1. Vascular Surgery (on-call MD unless specified) [[id number]] ordered by Emir Abad MD at 02/26/25 1957                 Angie Evans  [id number]  [id number]  [facility] OR/MAIN OR  Raul Kathleen*  2/26/2025    Chief Complaint   Patient presents with    Leg Swelling       HPI: Ms. Evans is a 49-year-old female with past medical history of asthma, hypothyroidism and hypertension, who presented to the emergency room with acute onset of left lower extremity swelling, pain, and redness.  She has admitted to immobilization recently with a broken toe.  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 In the emergency  "room, CTA of the abdomen and pelvis showed left lower extremity iliofemoral DVT.  CTA of the chest was negative for PE. She was given 1 dose of Lovenox in the emergency room.    Past Medical History:   Diagnosis Date    Asthma     COVID-19     Disease of thyroid gland     Hypertension        Past Surgical History:   Procedure Laterality Date    CHOLECYSTECTOMY      CYST REMOVAL Left     arm       Family History   Problem Relation Age of Onset    Breast cancer Maternal Grandmother     Breast cancer Paternal Grandmother        Social History     Socioeconomic History    Marital status:    Tobacco Use    Smoking status: Never    Smokeless tobacco: Never   Vaping Use    Vaping status: Never Used   Substance and Sexual Activity    Alcohol use: Never    Drug use: Never    Sexual activity: Defer       Allergies   Allergen Reactions    Zithromax [Azithromycin] Anaphylaxis       Hospital Medications (active)         Dose Frequency Start End    acetaminophen (TYLENOL) 160 MG/5ML oral solution 650 mg 650 mg Every 4 Hours PRN 2/26/2025 --    Admin Instructions: If given for fever, use fever parameter: fever greater than 100.4 °F  Based on patient request - if ordered for moderate or severe pain, provider allows for administration of a medication prescribed for a lower pain scale.    Do not exceed 4 grams of acetaminophen in a 24 hr period. Max dose of 2gm for AST/ALT greater than 120 units/L.    If given for pain, use the following pain scale:   Mild Pain = Pain Score of 1-3, CPOT 1-2  Moderate Pain = Pain Score of 4-6, CPOT 3-4  Severe Pain = Pain Score of 7-10, CPOT 5-8    Route: Oral    Linked Group 1: Placed in \"Or\" Linked Group        acetaminophen (TYLENOL) suppository 650 mg 650 mg Every 4 Hours PRN 2/26/2025 --    Admin Instructions: If given for fever, use fever parameter: fever greater than 100.4 °F  Based on patient request - if ordered for moderate or severe pain, provider allows for administration of a " "medication prescribed for a lower pain scale.    Do not exceed 4 grams of acetaminophen in a 24 hr period. Max dose of 2gm for AST/ALT greater than 120 units/L.    If given for pain, use the following pain scale:   Mild Pain = Pain Score of 1-3, CPOT 1-2  Moderate Pain = Pain Score of 4-6, CPOT 3-4  Severe Pain = Pain Score of 7-10, CPOT 5-8    Route: Rectal    Linked Group 1: Placed in \"Or\" Linked Group        acetaminophen (TYLENOL) tablet 650 mg 650 mg Every 4 Hours PRN 2/26/2025 --    Admin Instructions: If given for fever, use fever parameter: fever greater than 100.4 °F  Based on patient request - if ordered for moderate or severe pain, provider allows for administration of a medication prescribed for a lower pain scale.    Do not exceed 4 grams of acetaminophen in a 24 hr period. Max dose of 2gm for AST/ALT greater than 120 units/L.    If given for pain, use the following pain scale:   Mild Pain = Pain Score of 1-3, CPOT 1-2  Moderate Pain = Pain Score of 4-6, CPOT 3-4  Severe Pain = Pain Score of 7-10, CPOT 5-8    Route: Oral    Linked Group 1: Placed in \"Or\" Linked Group        bisacodyl (DULCOLAX) EC tablet 5 mg 5 mg Daily PRN 2/26/2025 --    Admin Instructions: Use if no bowel movement after 12 hours.  Swallow whole. Do not crush, split, or chew tablet.    Route: Oral    Linked Group 2: Placed in \"And\" Linked Group        bisacodyl (DULCOLAX) suppository 10 mg 10 mg Daily PRN 2/26/2025 --    Admin Instructions: Use if no bowel movement after 12 hours.  Hold for diarrhea    Route: Rectal    Linked Group 2: Placed in \"And\" Linked Group        Calcium Replacement - Follow Nurse / BPA Driven Protocol  As Needed 2/26/2025 --    Admin Instructions: Open Order & Select \"BHS Electrolyte Replacement Protocol Algorithm\" to View Details    Route: Not Applicable    ceFAZolin 2000 mg IVPB in 100 mL NS (MBP) 2,000 mg Once 2/27/2025 --    Admin Instructions: Administer Within 1 Hour of Surgical Incision.  Redose 4 Hours " "From Pre-Op Dose if Procedure Ongoing or Blood Loss Greater  Than 1.5 L    Caution: Look alike/sound alike drug alert    Route: Intravenous    cetirizine (zyrTEC) tablet 5 mg 5 mg Daily 2/27/2025 --    Route: Oral    Enoxaparin Sodium (LOVENOX) syringe 120 mg 1 mg/kg × 113 kg Every 12 Hours 2/27/2025 --    Admin Instructions: Give subcutaneous in abdomen only. Do not massage site after injection.    Route: Subcutaneous    hydroCHLOROthiazide tablet 25 mg 25 mg Daily 2/27/2025 --    Admin Instructions: Hold for SBP less than 100, DBP less than 60.  Caution: Look alike/sound alike drug alert    Route: Oral    HYDROcodone-acetaminophen (NORCO) 5-325 MG per tablet 1 tablet 1 tablet Every 6 Hours PRN 2/26/2025 3/3/2025    Admin Instructions: Based on patient request - if ordered for moderate or severe pain, provider allows for administration of a medication prescribed for a lower pain scale.  [MOR]    Do not exceed 4 grams of acetaminophen in a 24 hr period. Max dose of 2gm for AST/ALT greater than 120 units/L        If given for pain, use the following pain scale:   Mild Pain = Pain Score of 1-3, CPOT 1-2  Moderate Pain = Pain Score of 4-6, CPOT 3-4  Severe Pain = Pain Score of 7-10, CPOT 5-8    Route: Oral    lactated ringers infusion 100 mL/hr Continuous 2/27/2025 2/28/2025    Route: Intravenous    losartan (COZAAR) tablet 50 mg 50 mg Every 24 Hours Scheduled 2/27/2025 --    Admin Instructions: Hold for SBP less than 100, DBP less than 60.    Route: Oral    Magnesium Low Dose Replacement - Follow Nurse / BPA Driven Protocol  As Needed 2/26/2025 --    Admin Instructions: Open Order & Select \"BHS Electrolyte Replacement Protocol Algorithm\" to View Details    Route: Not Applicable    Midazolam HCl (PF) (VERSED) injection 2 mg 2 mg Every 10 Minutes PRN 2/27/2025 --    Admin Instructions: May repeat dose in 10 minutes one time then contact provider for additional orders.      Route: Intravenous    Morphine sulfate (PF) " "injection 1 mg 1 mg Every 4 Hours PRN 2/26/2025 3/3/2025    Admin Instructions: Based on patient request - if ordered for moderate or severe pain, provider allows for administration of a medication prescribed for a lower pain scale.    Caution: Look alike/sound alike drug alert    If given for pain, use the following pain scale:  Mild Pain = Pain Score of 1-3, CPOT 1-2  Moderate Pain = Pain Score of 4-6, CPOT 3-4  Severe Pain = Pain Score of 7-10, CPOT 5-8    Route: Intravenous    Linked Group 3: Placed in \"And\" Linked Group        naloxone (NARCAN) injection 0.4 mg 0.4 mg Every 5 Minutes PRN 2/26/2025 --    Admin Instructions: If respiratory rate is less than 8 breaths/minute or patient is difficult to arouse stop any narcotics and contact physician.   Administer slow IV push. Repeat as ordered until patient's respiratory rate is greater than 12 breaths/minute.    Route: Intravenous    Linked Group 3: Placed in \"And\" Linked Group        ondansetron (ZOFRAN) injection 4 mg 4 mg Every 6 Hours PRN 2/26/2025 --    Admin Instructions: If BOTH ondansetron (ZOFRAN) and promethazine (PHENERGAN) are ordered use ondansetron first and THEN promethazine IF ondansetron is ineffective.    Route: Intravenous    Phosphorus Replacement - Follow Nurse / BPA Driven Protocol  As Needed 2/26/2025 --    Admin Instructions: Open Order & Select \"BHS Electrolyte Replacement Protocol Algorithm\" to View Details    Route: Not Applicable    polyethylene glycol (MIRALAX) packet 17 g 17 g Daily PRN 2/26/2025 --    Admin Instructions: Use if no bowel movement after 12 hours. Mix in 6-8 ounces of water.  Use 4-8 ounces of water, tea, or juice for each 17 gram dose.    Route: Oral    Linked Group 2: Placed in \"And\" Linked Group        potassium chloride (KLOR-CON M20) CR tablet 40 mEq 40 mEq Every 4 Hours 2/27/2025 2/27/2025    Admin Instructions: Do not crush or chew the capsules or tablets. The drug may not work as designed if the capsule or " "tablet is crushed or chewed. Swallow whole.  Take with food.    Route: Oral    potassium chloride (KLOR-CON) packet 10 mEq 10 mEq 2 Times Daily 2/26/2025 --    Admin Instructions: For use with a feeding tube. Mix in at least 4 oz. of liquid.    Route: Oral    Potassium Replacement - Follow Nurse / BPA Driven Protocol  As Needed 2/26/2025 --    Admin Instructions: Open Order & Select \"BHS Electrolyte Replacement Protocol Algorithm\" to View Details    Route: Not Applicable    sennosides-docusate (PERICOLACE) 8.6-50 MG per tablet 2 tablet 2 tablet 2 Times Daily PRN 2/26/2025 --    Admin Instructions: Start bowel management regimen if patient has not had a bowel movement after 12 hours.    Route: Oral    Linked Group 2: Placed in \"And\" Linked Group        sodium chloride 0.9 % flush 10 mL 10 mL As Needed 2/26/2025 --    Route: Intravenous    Linked Group 4: Placed in \"And\" Linked Group        sodium chloride 0.9 % flush 10 mL 10 mL Every 12 Hours Scheduled 2/26/2025 --    Route: Intravenous    sodium chloride 0.9 % flush 10 mL 10 mL As Needed 2/26/2025 --    Route: Intravenous    sodium chloride 0.9 % flush 3 mL 3 mL Every 12 Hours Scheduled 2/27/2025 --    Route: Intravenous    sodium chloride 0.9 % flush 3-10 mL 3-10 mL As Needed 2/27/2025 --    Route: Intravenous    sodium chloride 0.9 % infusion 40 mL 40 mL As Needed 2/26/2025 --    Admin Instructions: Following administration of an IV intermittent medication, flush line with 40mL NS at 100mL/hr.    Route: Intravenous    sodium chloride 0.9 % infusion 40 mL 40 mL As Needed 2/27/2025 2/28/2025    Admin Instructions: Following administration of an IV intermittent medication, flush line with 40mL NS at 100mL/hr.    Route: Intravenous    thyroid (ARMOUR) tablet 90 mg ([MAR Hold] since 2/27/2025 10:44 AM) 90 mg Every Early Morning 2/27/2025 --    Route: Oral            Review of Systems   Constitutional: Negative.    HENT: Negative.     Eyes: Negative.    Respiratory: " "Negative.     Cardiovascular: Negative.    Gastrointestinal: Negative.    Endocrine: Negative.    Genitourinary: Negative.    Musculoskeletal: Negative.    Skin: Negative.    Allergic/Immunologic: Negative.    Neurological: Negative.    Hematological: Negative.    Psychiatric/Behavioral: Negative.     All other systems reviewed and are negative.      /65 (BP Location: Right arm, Patient Position: Lying)   Pulse 88   Temp 98.2 °F (36.8 °C) (Oral)   Resp 19   Ht 172.7 cm (68\")   Wt 115 kg (253 lb)   LMP 01/28/2025   SpO2 95%   BMI 38.47 kg/m²     Physical Exam  Vitals and nursing note reviewed.   Constitutional:       Appearance: She is well-developed.   HENT:      Head: Normocephalic and atraumatic.   Eyes:      General: No scleral icterus.     Pupils: Pupils are equal, round, and reactive to light.   Neck:      Thyroid: No thyromegaly.      Vascular: No carotid bruit or JVD.   Cardiovascular:      Rate and Rhythm: Normal rate and regular rhythm.      Pulses:           Carotid pulses are 2+ on the right side and 2+ on the left side.       Femoral pulses are 2+ on the right side and 2+ on the left side.       Popliteal pulses are 2+ on the right side and 2+ on the left side.        Dorsalis pedis pulses are 2+ on the right side and 2+ on the left side.        Posterior tibial pulses are 2+ on the right side and 2+ on the left side.      Heart sounds: Normal heart sounds.      Comments: Left leg swelling  Pulmonary:      Effort: Pulmonary effort is normal.      Breath sounds: Normal breath sounds.   Abdominal:      General: Bowel sounds are normal. There is no distension or abdominal bruit.      Palpations: Abdomen is soft. There is no mass.      Tenderness: There is no abdominal tenderness.   Musculoskeletal:         General: Normal range of motion.      Cervical back: Neck supple.      Left lower leg: Edema present.   Lymphadenopathy:      Cervical: No cervical adenopathy.   Skin:     General: Skin is " warm and dry.   Neurological:      Mental Status: She is alert and oriented to person, place, and time.      Cranial Nerves: No cranial nerve deficit.      Sensory: No sensory deficit.         Laboratory Data:  Results from last 7 days   Lab Units 02/26/25  1755   WBC 10*3/mm3 12.59*   HEMOGLOBIN g/dL 13.6   HEMATOCRIT % 37.6   PLATELETS 10*3/mm3 263       Results from last 7 days   Lab Units 02/27/25  0428 02/26/25  1755   SODIUM mmol/L 134* 132*   POTASSIUM mmol/L 3.0* 3.0*   CHLORIDE mmol/L 97* 94*   CO2 mmol/L 23.0 22.0   BUN mg/dL 7 8   CREATININE mg/dL 0.65 0.73   CALCIUM mg/dL 8.5* 8.8   BILIRUBIN mg/dL  --  0.9   ALK PHOS U/L  --  92   ALT (SGPT) U/L  --  20   AST (SGOT) U/L  --  20   GLUCOSE mg/dL 105* 104*     Results from last 7 days   Lab Units 02/26/25  1755   PROTIME Seconds 13.6   INR  0.99         Diagnostic Data:  Imaging Results (Last 24 Hours)       Procedure Component Value Units Date/Time    IR Venogram Extremity [[id number]] Resulted: 02/27/25 1054     Updated: 02/27/25 1054    CT Angiogram Abdomen Pelvis [[id number]] Collected: 02/26/25 2041     Updated: 02/26/25 2048    Narrative:      EXAM: CT ANGIOGRAM ABDOMEN PELVIS- - 2/26/2025 7:15 PM     HISTORY: venous phasing for dvt- ordered by vascular; I82.4Y2-Acute  embolism and thrombosis of unspecified deep veins of left proximal lower  extremity       COMPARISON: None available.     Automatic exposure control was utilized to make radiation dose as low as  reasonably achievable.     TECHNIQUE: Enhanced axial images of the abdomen and pelvis obtained with  multiplanar reformats. 3D postprocessing, including MIPs, performed and  images saved to PACS.     FINDINGS:  VISUALIZED CHEST: No pericardial or pleural effusion is seen. Lung bases  are grossly clear.     LIVER/BILIARY: Hepatic parenchyma is unremarkable. Patient is status  post cholecystectomy. Bile ducts are unremarkable.     KIDNEYS/URETERS: Bilateral kidneys and ureters are unremarkable.  There  is no hydronephrosis.     ADRENAL: Unremarkable.     SPLEEN: Unremarkable.     PANCREAS: Unremarkable.        PERITONEUM/RETROPERITONEUM: No free air, ascites, or lymphadenopathy.     GI TRACT: There is no bowel obstruction or mass.     PELVIS: Uterus and adnexa are unremarkable. Urinary bladder is  incompletely distended. No free fluid or lymphadenopathy is seen.     SOFT TISSUES: There is edema in the left inguinal soft tissues and  subcutaneous soft tissues of the proximal left lower extremity.     BONES: No acute or aggressive bony lesion.            VESSELS:     AORTA/ABDOMINAL-PELVIC VESSELS: Abdominal aorta is unremarkable. No  aneurysm, stenosis, or occlusion is seen. Bilateral common iliac  arteries and bilateral internal and external iliac arteries are patent  without stenosis or occlusion.     There is enlargement of the proximal left external iliac vein with  filling defects consistent with deep venous thrombosis extends  anteriorly to the left common femoral vein and left superficial femoral  vein.             Impression:      1. Deep venous thrombosis on the left involving the external iliac vein,  common femoral vein, and superficial femoral vein and associated edema  in the subcutaneous soft tissues of the proximal left lower extremity  and left inguinal soft tissues.     This report was signed and finalized on 2/26/2025 8:45 PM by Giovanni Hernandez.       CT Angiogram Chest [[id number]] Collected: 02/26/25 2038     Updated: 02/26/25 2044    Narrative:      EXAM: CT ANGIOGRAM CHEST-      DATE: 2/26/2025 7:15 PM     HISTORY: Lower extremity DVT     COMPARISON: None available.     Automatic exposure control was utilized to make radiation dose as low as  reasonably achievable.     TECHNIQUE: Enhanced CT images of the chest obtained with multiplanar  reformats.     FINDINGS:     MEDIASTINUM/EXTRATHORACIC:   Thoracic aorta is unremarkable. There is no  significant coronary artery calcification. No  pericardial or pleural  effusion is identified. No thoracic lymphadenopathy is seen.     PULMONARY ARTERIES: Pulmonary arteries are opacified and no filling  defects are seen.     LUNGS/AIRWAYS: There is mild scarring at the superior segment of the  left lower lobe. No pneumonia is seen. Airways are unremarkable.        INCLUDED UPPER ABDOMEN: The visualized portions of the upper abdomen are  within normal limits. There is a small hiatal hernia.     SOFT TISSUES: Submitted soft tissues of the chest are unremarkable.     BONES: No suspicious osseous lesions identified.       Impression:      1. No pulmonary embolism identified or active disease in the chest.     This report was signed and finalized on 2/26/2025 8:41 PM by Giovanni Hernandez.       US Venous Doppler Lower Extremity Left (duplex) [[id number]] Resulted: 02/26/25 1824     Updated: 02/26/25 1848            Impression:    DVT (deep venous thrombosis)      Plan: After thoroughly evaluating Angie Evans, I believe the best course of action is to proceed with mechanical thrombectomy of her left lower extremity iliofemoral DVT.  Risk/benefits were explained at great length to the patient which include but not limited to bleeding, infection, vessel damage, nerve damage, and pulmonary embolus.  The patient understands the risks and wished for me to proceed.  Moving forward, the patient will need to be on oral anticoagulation for at least 3 to 6 months.  This was all discussed in full with complete understanding.  Thank you for allowing me to see Angie Evans in consult. Please feel free to reach out with any questions or concerns.    Eleazar Rubio DO  2/27/2025  11:00 CST       Electronically signed by Eleazar Rubio DO at 02/27/25 1105          Discharge Summary        Raul Kathleen MD at 02/28/25 4324                [facility] Medicine Services  DISCHARGE SUMMARY       Date of Admission: 2/26/2025  Date  of Discharge:  2/28/2025  Primary Care Physician: Erendira Finney APRN    Presenting Problem/History of Present Illness:  Left lower extremity swelling, pain and redness  Final Discharge Diagnoses:  Acute left lower extremity DVT status post mechanical thrombectomy of the left lower extremity common iliac, external iliac, common femoral, superficial femoral, and popliteal veins using the Penumbra 16 [place] lightning flash aspiration catheter (February 27);   -Other chronic medical conditions-  Hypothyroidism (euthyroid)  Hypertension   Hypokalemia  Asthma-not in exacerbation  Obesity with BMI of 38.5      Consults:   Vascular    Procedures Performed:   PREOPERATIVE DIAGNOSIS: Acute deep vein thrombosis (DVT) of proximal vein of left lower extremity [I82.4Y2]     POSTOPERATIVE DIAGNOSIS: Post-Op Diagnosis Codes:     * Acute deep vein thrombosis (DVT) of proximal vein of left lower extremity [I82.4Y2]     PROCEDURE PERFORMED:   1.  Prone positioning  2.  Ultrasound-guided cannulation of the left popliteal vein  3.  Left lower extremity venogram with radiographic supervision and interpretation  4.  Mechanical thrombectomy of the left lower extremity common iliac, external iliac, common femoral, superficial femoral, and popliteal veins using the Penumbra 16 [place] lightning flash aspiration catheter  5.  Balloon occlusion of the distal superficial femoral vein using an 8 x 40 mm EverCross balloon  6.  Coil embolization of a distal superficial femoral vein sidebranch using an 8 x 12 mm and (2) 8 x 24 mm Terumo CX coils      SURGEON: Eleazar Rubio,    Pertinent Test Results:       Imaging Results (All)       Procedure Component Value Units Date/Time    IR Venogram Extremity [[id number]] Collected: 02/28/25 0636     Updated: 02/28/25 0636    Narrative:      Performed by Dr. Rubio. Please see procedure note.       US Venous Doppler Lower Extremity Left (duplex) [[id number]] Collected: 02/27/25 3499     Updated:  02/27/25 1705    Narrative:      History: Swelling       Impression:      Impression: DVT noted in the left common femoral vein, saphenofemoral  junction, superficial femoral vein, popliteal vein, and tibial veins.     Comments: Left lower extremity venous duplex exam was performed using  color Doppler flow, Doppler wave form analysis, and grayscale imaging,  with and without compression. There is occlusive thrombus of the left  common femoral vein. This extends down through the popliteal vein and  into the tibial veins.        This report was signed and finalized on 2/27/2025 5:02 PM by Arthur Rose.       FL C Arm During Surgery [[id number]] Resulted: 02/27/25 1229     Updated: 02/27/25 1229    Narrative:      This procedure was auto-finalized with no dictation required.    US Guided Vascular Access [[id number]] Resulted: 02/27/25 1134     Updated: 02/27/25 1144    CT Angiogram Abdomen Pelvis [[id number]] Collected: 02/26/25 2041     Updated: 02/26/25 2048    Narrative:      EXAM: CT ANGIOGRAM ABDOMEN PELVIS- - 2/26/2025 7:15 PM     HISTORY: venous phasing for dvt- ordered by vascular; I82.4Y2-Acute  embolism and thrombosis of unspecified deep veins of left proximal lower  extremity       COMPARISON: None available.     Automatic exposure control was utilized to make radiation dose as low as  reasonably achievable.     TECHNIQUE: Enhanced axial images of the abdomen and pelvis obtained with  multiplanar reformats. 3D postprocessing, including MIPs, performed and  images saved to PACS.     FINDINGS:  VISUALIZED CHEST: No pericardial or pleural effusion is seen. Lung bases  are grossly clear.     LIVER/BILIARY: Hepatic parenchyma is unremarkable. Patient is status  post cholecystectomy. Bile ducts are unremarkable.     KIDNEYS/URETERS: Bilateral kidneys and ureters are unremarkable. There  is no hydronephrosis.     ADRENAL: Unremarkable.     SPLEEN: Unremarkable.     PANCREAS: Unremarkable.         PERITONEUM/RETROPERITONEUM: No free air, ascites, or lymphadenopathy.     GI TRACT: There is no bowel obstruction or mass.     PELVIS: Uterus and adnexa are unremarkable. Urinary bladder is  incompletely distended. No free fluid or lymphadenopathy is seen.     SOFT TISSUES: There is edema in the left inguinal soft tissues and  subcutaneous soft tissues of the proximal left lower extremity.     BONES: No acute or aggressive bony lesion.            VESSELS:     AORTA/ABDOMINAL-PELVIC VESSELS: Abdominal aorta is unremarkable. No  aneurysm, stenosis, or occlusion is seen. Bilateral common iliac  arteries and bilateral internal and external iliac arteries are patent  without stenosis or occlusion.     There is enlargement of the proximal left external iliac vein with  filling defects consistent with deep venous thrombosis extends  anteriorly to the left common femoral vein and left superficial femoral  vein.             Impression:      1. Deep venous thrombosis on the left involving the external iliac vein,  common femoral vein, and superficial femoral vein and associated edema  in the subcutaneous soft tissues of the proximal left lower extremity  and left inguinal soft tissues.     This report was signed and finalized on 2/26/2025 8:45 PM by Giovanni Hernandez.       CT Angiogram Chest [[id number]] Collected: 02/26/25 2038     Updated: 02/26/25 2044    Narrative:      EXAM: CT ANGIOGRAM CHEST-      DATE: 2/26/2025 7:15 PM     HISTORY: Lower extremity DVT     COMPARISON: None available.     Automatic exposure control was utilized to make radiation dose as low as  reasonably achievable.     TECHNIQUE: Enhanced CT images of the chest obtained with multiplanar  reformats.     FINDINGS:     MEDIASTINUM/EXTRATHORACIC:   Thoracic aorta is unremarkable. There is no  significant coronary artery calcification. No pericardial or pleural  effusion is identified. No thoracic lymphadenopathy is seen.     PULMONARY ARTERIES:  Pulmonary arteries are opacified and no filling  defects are seen.     LUNGS/AIRWAYS: There is mild scarring at the superior segment of the  left lower lobe. No pneumonia is seen. Airways are unremarkable.        INCLUDED UPPER ABDOMEN: The visualized portions of the upper abdomen are  within normal limits. There is a small hiatal hernia.     SOFT TISSUES: Submitted soft tissues of the chest are unremarkable.     BONES: No suspicious osseous lesions identified.       Impression:      1. No pulmonary embolism identified or active disease in the chest.     This report was signed and finalized on 2/26/2025 8:41 PM by Giovanni Hernandez.             LAB RESULTS:      Lab 02/28/25  1404 02/26/25  1755   WBC 11.45* 12.59*   HEMOGLOBIN 10.8* 13.6   HEMATOCRIT 31.3* 37.6   PLATELETS 254 263   NEUTROS ABS  --  10.29*   IMMATURE GRANS (ABS)  --  0.04   LYMPHS ABS  --  1.55   MONOS ABS  --  0.54   EOS ABS  --  0.13   MCV 89.4 86.8   PROTIME  --  13.6         Lab 02/28/25  1404 02/27/25  1933 02/27/25  0428 02/26/25  1755   SODIUM 135*  --  134* 132*   POTASSIUM 3.2* 3.6 3.0* 3.0*   CHLORIDE 100  --  97* 94*   CO2 24.0  --  23.0 22.0   ANION GAP 11.0  --  14.0 16.0*   BUN 8  --  7 8   CREATININE 0.75  --  0.65 0.73   EGFR 97.1  --  107.4 100.3   GLUCOSE 140*  --  105* 104*   CALCIUM 8.6  --  8.5* 8.8   MAGNESIUM  --   --  2.1 1.9   TSH  --   --  2.720  --          Lab 02/26/25  1755   TOTAL PROTEIN 7.4   ALBUMIN 3.8   GLOBULIN 3.6   ALT (SGPT) 20   AST (SGOT) 20   BILIRUBIN 0.9   ALK PHOS 92         Lab 02/26/25  1755   PROTIME 13.6   INR 0.99             Lab 02/27/25  1012   ABO TYPING A   RH TYPING Negative   ANTIBODY SCREEN Negative         Brief Urine Lab Results  (Last result in the past 365 days)        Color   Clarity   Blood   Leuk Est   Nitrite   Protein   CREAT   Urine HCG        02/26/25 2059               Negative             Microbiology Results (last 10 days)       ** No results found for the last 240 hours. **  "           Hospital Course:   49-year-old woman who was admitted for  left lower extremity swelling pain and redness and been found on CT of the abdomen and pelvis to have left lower extremity DVT prompting further evaluation with CTA of chest which was negative for PE.  Patient was started on anticoagulation.     She was taken for surgery by Dr. Rubio today  PREOPERATIVE DIAGNOSIS: Acute deep vein thrombosis (DVT) of proximal vein of left lower extremity [I82.4Y2]     POSTOPERATIVE DIAGNOSIS: Post-Op Diagnosis Codes:     * Acute deep vein thrombosis (DVT) of proximal vein of left lower extremity [I82.4Y2]     PROCEDURE PERFORMED:   1.  Prone positioning  2.  Ultrasound-guided cannulation of the left popliteal vein  3.  Left lower extremity venogram with radiographic supervision and interpretation  4.  Mechanical thrombectomy of the left lower extremity common iliac, external iliac, common femoral, superficial femoral, and popliteal veins using the Penumbra 16 [place] lightning flash aspiration catheter  5.  Balloon occlusion of the distal superficial femoral vein using an 8 x 40 mm EverCross balloon  6.  Coil embolization of a distal superficial femoral vein sidebranch using an 8 x 12 mm and (2) 8 x 24 mm Terumo CX coils      SURGEON: Eleazar Rubio DO         Noted patient has hypokalemia.  Patient been on potassium replacement.  Noted patient also on hydrochlorothiazide     Patient doing well postoperatively  Patient has been transitioned to DOAC.  No loading needed as per discussion with Dr. Rubio.  Questions were answered to the best of my abilities.    Physical Exam on Discharge:  /63 (BP Location: Left arm, Patient Position: Lying)   Pulse 80   Temp 97.9 °F (36.6 °C) (Oral)   Resp 14   Ht 172.7 cm (68\")   Wt 115 kg (253 lb)   SpO2 100%   BMI 38.47 kg/m²   Physical Exam  Extremely anxious at the time of visit which after visit by BRENT Santo felt reassured and now would wish to go home.  Flush " facial skin  GEN: Awake, alert, interactive, in NAD  HEENT: Atraumatic, PERRLA, EOMI, Anicteric, Trachea midline  Lungs: CTAB, no wheezing/rales/rhonchi  Heart: RRR, +S1/s2, no rub  ABD: soft, nt/nd, +BS, no guarding/rebound  Extremities: atraumatic, no cyanosis, wrapped with bandage on the left lower extremity skin: no rashes or lesions  Neuro: AAOx3, no focal deficits  Psych: Extremely anxious.  Condition on Discharge: Stable    Discharge Disposition:  Home or Self Care    Discharge Medications:     Discharge Medications        New Medications        Instructions Start Date   apixaban 5 MG tablet tablet  Commonly known as: ELIQUIS   5 mg, Oral, Every 12 Hours Scheduled      HYDROcodone-acetaminophen 5-325 MG per tablet  Commonly known as: NORCO   1 tablet, Oral, Every 6 Hours PRN             Continue These Medications        Instructions Start Date   albuterol sulfate  (90 Base) MCG/ACT inhaler  Commonly known as: PROVENTIL HFA;VENTOLIN HFA;PROAIR HFA   2 puffs, Inhalation, Every 4 Hours PRN      cetirizine 5 MG tablet  Commonly known as: zyrTEC   5 mg, Oral, Daily      losartan 50 MG tablet  Commonly known as: COZAAR   50 mg, Oral, 2 Times Daily      nystatin [id number] UNIT/GM ointment  Commonly known as: MYCOSTATIN   1 Application, 2 Times Daily      Thyroid 90 MG tablet  Commonly known as: ARMOUR   90 mg, Oral, Daily             Stop These Medications      hydroCHLOROthiazide 25 MG tablet     potassium chloride 10 MEQ CR capsule  Commonly known as: MICRO-K          Due to patient's hypokalemia, I discontinued hydrochlorothiazide.  I recommend monitoring of blood pressure and follow closely with primary care provider.  We gave him supplemental potassium.      This patient has current or prior documentation of an left ventricular ejection fraction (LVEF) of less than or equal to 40%.-Not applicable.    Discharge Diet:   Diet Instructions       Diet: Regular/House Diet; Regular (IDDSI 7); Thin (IDDSI 0)       Discharge Diet: Regular/House Diet    Texture: Regular (IDDSI 7)    Fluid Consistency: Thin (IDDSI 0)            Activity at Discharge:   Activity Instructions       Gradually Increase Activity Until at Pre-Hospitalization Level              Follow-up Appointments:   PCP within 1 week  Vascular surgery as per their recommendation    Test Results Pending at Discharge: None    Electronically signed by Raul Kathleen MD, 02/28/25, 17:44 CST.    Time: Greater than 30 minutes.          Electronically signed by Raul Kathleen MD at 02/28/25 0737

## 2025-03-02 ENCOUNTER — NURSE TRIAGE (OUTPATIENT)
Dept: CALL CENTER | Facility: HOSPITAL | Age: 50
End: 2025-03-02
Payer: COMMERCIAL

## 2025-03-02 NOTE — TELEPHONE ENCOUNTER
"Patient calling to get clarification about her medications from discharge from the hospital. She said that she did not know that she was supposed to stop taking potassium and HCTZ after discharge. She said that she has continued to take them both until today.   I reviewed the patient AVS and her chart.   I  assured her that she should stop the HCTZ and potassium as directed in her AVS.   I also looked through notes from Vascular surgery and saw that she needs to follow up with Seferino CURTIS in 2 weeks. I explained that their office should reach out to her to schedule the appointment, but incase they do not, I gave her the office number.     Reason for Disposition   Health Information question, no triage required and triager able to answer question    Additional Information   Negative: [1] Caller is not with the adult (patient) AND [2] reporting urgent symptoms   Negative: Lab result questions   Negative: Medication questions   Negative: Caller can't be reached by phone   Negative: Caller has already spoken to PCP or another triager   Negative: RN needs further essential information from caller in order to complete triage   Negative: Requesting regular office appointment   Negative: [1] Caller requesting NON-URGENT health information AND [2] PCP's office is the best resource    Answer Assessment - Initial Assessment Questions  1. REASON FOR CALL or QUESTION: \"What is your reason for calling today?\" or \"How can I best help you?\" or \"What question do you have that I can help answer?\"      Should she stop her HCTZ and potassium as directed in her AVS?   Who is the APRN with Dr Rubio that she is supposed to follow up with in 2 weeks?    Protocols used: Information Only Call-ADULT-AH    "

## 2025-03-03 NOTE — PAYOR COMM NOTE
"3/3/25 [facility] [phone]  [phone]    WE RECEIVED A DENIAL ON THIS PATIENT. FAXING FOR   RECONSIDERATION FOR INPATIENT            Angie Evans (49 y.o. Female)       Date of Birth   1975    Social Security Number       Address   [address] [place] [address] [address] [place] [address]    Home Phone   [phone]    MRN   [id number]       Latter-day   Non-Hindu    Marital Status                               Admission Date   2/26/25    Admission Type   Emergency    Admitting Provider   Raul Kathleen MD    Attending Provider       Department, Room/Bed   [facility] 4B, 450/1       Discharge Date   2/28/2025    Discharge Disposition   Home or Self Care    Discharge Destination   Home                              Attending Provider: (none)   Allergies: Zithromax [Azithromycin]    Isolation: None   Infection: None   Code Status: Prior    Ht: 172.7 cm (68\")   Wt: 115 kg (253 lb)    Admission Cmt: None   Principal Problem: None                  Active Insurance as of 2/26/2025       Primary Coverage       Payor Plan Insurance Group Employer/Plan Group    [employer] PPO [id number]       Payor Plan Address Payor Plan Phone Number Payor Plan Fax Number Effective Dates    [address] [phone]  8/1/2021 - None Entered    [facility] [id number]         Subscriber Name Subscriber Birth Date Member ID       CYNTHIA EVANS P 6/1/1976 [id number]                     Emergency Contacts        (Rel.) Home Phone Work Phone Mobile Phone    SABRINA CHAMBERS (Mother) -- -- [phone]    Janna Evans (Daughter) -- -- [phone]    Cynthia Evans (Spouse) [phone] -- [phone]             Eleazar Rubio,    Physician  Vascular Surgery     Op Note      Signed     Date of Service: 02/27/25 1120  Creation Time: 02/27/25 1214  Case Time: Procedures: Surgeons:   02/27/25 1120 DVT THROMBOLYSIS LEFT LOWER EXTREMITY "    Eleazar Rubio DO               Signed         Angie Evans  2/27/2025     PREOPERATIVE DIAGNOSIS: Acute deep vein thrombosis (DVT) of proximal vein of left lower extremity [I82.4Y2]     POSTOPERATIVE DIAGNOSIS: Post-Op Diagnosis Codes:     * Acute deep vein thrombosis (DVT) of proximal vein of left lower extremity [I82.4Y2]     PROCEDURE PERFORMED:   1.  Prone positioning  2.  Ultrasound-guided cannulation of the left popliteal vein  3.  Left lower extremity venogram with radiographic supervision and interpretation  4.  Mechanical thrombectomy of the left lower extremity common iliac, external iliac, common femoral, superficial femoral, and popliteal veins using the Penumbra 16 [place] lightning flash aspiration catheter  5.  Balloon occlusion of the distal superficial femoral vein using an 8 x 40 mm EverCross balloon  6.  Coil embolization of a distal superficial femoral vein sidebranch using an 8 x 12 mm and (2) 8 x 24 mm Terumo CX coils      SURGEON: Eleazar Rubio DO      ANESTHESIA: General     PREPARATION: Routine.     STAFF: Circulator: Annette Ho RN  Scrub Person: Harini Diego  Assistant: Mirtha Corley  Vascular Radiology Technician: Shameka Saravia  Vascular Ultrasound Technician: Ben Norton     Estimated Blood Loss: minimal     SPECIMENS: None     COMPLICATIONS: None     INDICATIONS: Angie Evans is a 49 y.o. female with past medical history of asthma, hypothyroidism and hypertension, who presented to the emergency room with acute onset of left lower extremity swelling, pain, and redness.  She has admitted to immobilization recently with a broken toe.  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 In the emergency room, CTA of the abdomen and pelvis showed left lower extremity iliofemoral DVT.  CTA of the chest was negative for PE. She was given 1 dose of Lovenox in the emergency room. The indications, risks,  and possible complications of the procedure were explained to the patient, who voiced understanding and wished to proceed with surgery.     PROCEDURE IN DETAIL: The patient was taken to the operating room and placed on the operating table in a prone position. After general anesthesia was obtained, the left lower extremity was prepped and draped in a sterile manner.  Under ultrasound guidance, and using a micropuncture technique, the left popliteal vein was cannulated and a microsheath was placed.  Advantage Glidewire was advanced up into the common femoral vein under fluoroscopic guidance.  A 6 Samoan sheath was placed for predilatation.  Next the 17 [place]oan Penumbra short sheath was placed.  A venogram of the left lower extremity was performed which showed significant clot burden from the sheath cannulation site all the way up into the distal common iliac vein.  Next, mechanical thrombectomy was then performed of the common iliac, external iliac, common femoral, superficial femoral, and popliteal veins using the Penumbra 16 [place] lightning flash aspiration catheter.  A significant amount of clot burden was removed.  Completion venogram was performed which showed complete resolution of all the clot burden and return of flow to the left lower extremity.  There is a dual system for the superficial femoral vein that had clot in it.  This was cannulated with the advantage Glidewire and angled glide catheter.  The lightening flash catheter was passed into this branch but a small perforation occurred.  An 8 x 40 mm EverCross balloon was used to occlude the takeoff while direct pressure was held on the skin to achieve hemostasis.  Next, the decision was made to place coils at the takeoff to occlude the sidebranch.  An 8 x 12 mm and  (2)  8 x 24 mm Terumo CX coils were used successfully.  At this point, I felt no further intervention was warranted.  The sheath was removed and direct pressure was held for an additional 10 to  15 minutes to ensure hemostasis.  The leg was wrapped with an Ace wrap and a knee immobilizer was placed. The patient tolerated the procedure well. Sponge and needle counts were correct. The patient was then awakened and extubated in the operating room and taken to the recovery room in good condition.     Eleazar uRbio DO  Date: 2/27/2025          Time: 12:14 CST     CC:Erendira Finney, Eleazar Urena DO   Physician  Vascular Surgery     Consults      Signed     Date of Service: 02/27/25 1100  Creation Time: 02/27/25 1100  Consult Orders   Vascular Surgery (on-call MD unless specified) [[id number]] ordered by Emir Abad MD at 02/26/25 1957          Signed       Expand All Collapse All    Angie Evans  [id number]  [id number]  [facility] OR/MAIN OR  Raul Kathleen*  2/26/2025         Chief Complaint   Patient presents with    Leg Swelling         HPI: Ms. Evans is a 49-year-old female with past medical history of asthma, hypothyroidism and hypertension, who presented to the emergency room with acute onset of left lower extremity swelling, pain, and redness.  She has admitted to immobilization recently with a broken toe.  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 In the emergency room, CTA of the abdomen and pelvis showed left lower extremity iliofemoral DVT.  CTA of the chest was negative for PE. She was given 1 dose of Lovenox in the emergency room.     Medical History        Past Medical History:   Diagnosis Date    Asthma      COVID-19      Disease of thyroid gland      Hypertension              Surgical History         Past Surgical History:   Procedure Laterality Date    CHOLECYSTECTOMY        CYST REMOVAL Left       arm                  Family History   Problem Relation Age of Onset    Breast cancer Maternal Grandmother      Breast cancer Paternal Grandmother           Social History   Social  "History           Socioeconomic History    Marital status:    Tobacco Use    Smoking status: Never    Smokeless tobacco: Never   Vaping Use    Vaping status: Never Used   Substance and Sexual Activity    Alcohol use: Never    Drug use: Never    Sexual activity: Defer            Allergies        Allergies   Allergen Reactions    Zithromax [Azithromycin] Anaphylaxis            Hospital Medications (active)           Dose Frequency Start End     acetaminophen (TYLENOL) 160 MG/5ML oral solution 650 mg 650 mg Every 4 Hours PRN 2/26/2025 --     Admin Instructions: If given for fever, use fever parameter: fever greater than 100.4 °F  Based on patient request - if ordered for moderate or severe pain, provider allows for administration of a medication prescribed for a lower pain scale.     Do not exceed 4 grams of acetaminophen in a 24 hr period. Max dose of 2gm for AST/ALT greater than 120 units/L.     If given for pain, use the following pain scale:   Mild Pain = Pain Score of 1-3, CPOT 1-2  Moderate Pain = Pain Score of 4-6, CPOT 3-4  Severe Pain = Pain Score of 7-10, CPOT 5-8     Route: Oral     Linked Group 1: Placed in \"Or\" Linked Group             acetaminophen (TYLENOL) suppository 650 mg 650 mg Every 4 Hours PRN 2/26/2025 --     Admin Instructions: If given for fever, use fever parameter: fever greater than 100.4 °F  Based on patient request - if ordered for moderate or severe pain, provider allows for administration of a medication prescribed for a lower pain scale.     Do not exceed 4 grams of acetaminophen in a 24 hr period. Max dose of 2gm for AST/ALT greater than 120 units/L.     If given for pain, use the following pain scale:   Mild Pain = Pain Score of 1-3, CPOT 1-2  Moderate Pain = Pain Score of 4-6, CPOT 3-4  Severe Pain = Pain Score of 7-10, CPOT 5-8     Route: Rectal     Linked Group 1: Placed in \"Or\" Linked Group             acetaminophen (TYLENOL) tablet 650 mg 650 mg Every 4 Hours PRN 2/26/2025 " "--     Admin Instructions: If given for fever, use fever parameter: fever greater than 100.4 °F  Based on patient request - if ordered for moderate or severe pain, provider allows for administration of a medication prescribed for a lower pain scale.     Do not exceed 4 grams of acetaminophen in a 24 hr period. Max dose of 2gm for AST/ALT greater than 120 units/L.     If given for pain, use the following pain scale:   Mild Pain = Pain Score of 1-3, CPOT 1-2  Moderate Pain = Pain Score of 4-6, CPOT 3-4  Severe Pain = Pain Score of 7-10, CPOT 5-8     Route: Oral     Linked Group 1: Placed in \"Or\" Linked Group             bisacodyl (DULCOLAX) EC tablet 5 mg 5 mg Daily PRN 2/26/2025 --     Admin Instructions: Use if no bowel movement after 12 hours.  Swallow whole. Do not crush, split, or chew tablet.     Route: Oral     Linked Group 2: Placed in \"And\" Linked Group             bisacodyl (DULCOLAX) suppository 10 mg 10 mg Daily PRN 2/26/2025 --     Admin Instructions: Use if no bowel movement after 12 hours.  Hold for diarrhea     Route: Rectal     Linked Group 2: Placed in \"And\" Linked Group             Calcium Replacement - Follow Nurse / BPA Driven Protocol   As Needed 2/26/2025 --     Admin Instructions: Open Order & Select \"[facility] Electrolyte Replacement Protocol Algorithm\" to View Details     Route: Not Applicable     ceFAZolin 2000 mg IVPB in 100 mL NS (MBP) 2,000 mg Once 2/27/2025 --     Admin Instructions: Administer Within 1 Hour of Surgical Incision.  Redose 4 Hours From Pre-Op Dose if Procedure Ongoing or Blood Loss Greater  Than 1.5 L     Caution: Look alike/sound alike drug alert     Route: Intravenous     cetirizine (zyrTEC) tablet 5 mg 5 mg Daily 2/27/2025 --     Route: Oral     Enoxaparin Sodium (LOVENOX) syringe 120 mg 1 mg/kg × 113 kg Every 12 Hours 2/27/2025 --     Admin Instructions: Give subcutaneous in abdomen only. Do not massage site after injection.     Route: Subcutaneous     hydroCHLOROthiazide " "tablet 25 mg 25 mg Daily 2/27/2025 --     Admin Instructions: Hold for SBP less than 100, DBP less than 60.  Caution: Look alike/sound alike drug alert     Route: Oral     HYDROcodone-acetaminophen (NORCO) 5-325 MG per tablet 1 tablet 1 tablet Every 6 Hours PRN 2/26/2025 3/3/2025     Admin Instructions: Based on patient request - if ordered for moderate or severe pain, provider allows for administration of a medication prescribed for a lower pain scale.  [MOR]     Do not exceed 4 grams of acetaminophen in a 24 hr period. Max dose of 2gm for AST/ALT greater than 120 units/L           If given for pain, use the following pain scale:   Mild Pain = Pain Score of 1-3, CPOT 1-2  Moderate Pain = Pain Score of 4-6, CPOT 3-4  Severe Pain = Pain Score of 7-10, CPOT 5-8     Route: Oral     lactated ringers infusion 100 mL/hr Continuous 2/27/2025 2/28/2025     Route: Intravenous     losartan (COZAAR) tablet 50 mg 50 mg Every 24 Hours Scheduled 2/27/2025 --     Admin Instructions: Hold for SBP less than 100, DBP less than 60.     Route: Oral     Magnesium Low Dose Replacement - Follow Nurse / BPA Driven Protocol   As Needed 2/26/2025 --     Admin Instructions: Open Order & Select \"BHS Electrolyte Replacement Protocol Algorithm\" to View Details     Route: Not Applicable     Midazolam HCl (PF) (VERSED) injection 2 mg 2 mg Every 10 Minutes PRN 2/27/2025 --     Admin Instructions: May repeat dose in 10 minutes one time then contact provider for additional orders.        Route: Intravenous     Morphine sulfate (PF) injection 1 mg 1 mg Every 4 Hours PRN 2/26/2025 3/3/2025     Admin Instructions: Based on patient request - if ordered for moderate or severe pain, provider allows for administration of a medication prescribed for a lower pain scale.     Caution: Look alike/sound alike drug alert     If given for pain, use the following pain scale:  Mild Pain = Pain Score of 1-3, CPOT 1-2  Moderate Pain = Pain Score of 4-6, CPOT " "3-4  Severe Pain = Pain Score of 7-10, CPOT 5-8     Route: Intravenous     Linked Group 3: Placed in \"And\" Linked Group             naloxone (NARCAN) injection 0.4 mg 0.4 mg Every 5 Minutes PRN 2/26/2025 --     Admin Instructions: If respiratory rate is less than 8 breaths/minute or patient is difficult to arouse stop any narcotics and contact physician.   Administer slow IV push. Repeat as ordered until patient's respiratory rate is greater than 12 breaths/minute.     Route: Intravenous     Linked Group 3: Placed in \"And\" Linked Group             ondansetron (ZOFRAN) injection 4 mg 4 mg Every 6 Hours PRN 2/26/2025 --     Admin Instructions: If BOTH ondansetron (ZOFRAN) and promethazine (PHENERGAN) are ordered use ondansetron first and THEN promethazine IF ondansetron is ineffective.     Route: Intravenous     Phosphorus Replacement - Follow Nurse / BPA Driven Protocol   As Needed 2/26/2025 --     Admin Instructions: Open Order & Select \"BHS Electrolyte Replacement Protocol Algorithm\" to View Details     Route: Not Applicable     polyethylene glycol (MIRALAX) packet 17 g 17 g Daily PRN 2/26/2025 --     Admin Instructions: Use if no bowel movement after 12 hours. Mix in 6-8 ounces of water.  Use 4-8 ounces of water, tea, or juice for each 17 gram dose.     Route: Oral     Linked Group 2: Placed in \"And\" Linked Group             potassium chloride (KLOR-CON M20) CR tablet 40 mEq 40 mEq Every 4 Hours 2/27/2025 2/27/2025     Admin Instructions: Do not crush or chew the capsules or tablets. The drug may not work as designed if the capsule or tablet is crushed or chewed. Swallow whole.  Take with food.     Route: Oral     potassium chloride (KLOR-CON) packet 10 mEq 10 mEq 2 Times Daily 2/26/2025 --     Admin Instructions: For use with a feeding tube. Mix in at least 4 oz. of liquid.     Route: Oral     Potassium Replacement - Follow Nurse / BPA Driven Protocol   As Needed 2/26/2025 --     Admin Instructions: Open Order & " "Select \"[facility] Electrolyte Replacement Protocol Algorithm\" to View Details     Route: Not Applicable     sennosides-docusate (PERICOLACE) 8.6-50 MG per tablet 2 tablet 2 tablet 2 Times Daily PRN 2/26/2025 --     Admin Instructions: Start bowel management regimen if patient has not had a bowel movement after 12 hours.     Route: Oral     Linked Group 2: Placed in \"And\" Linked Group             sodium chloride 0.9 % flush 10 mL 10 mL As Needed 2/26/2025 --     Route: Intravenous     Linked Group 4: Placed in \"And\" Linked Group             sodium chloride 0.9 % flush 10 mL 10 mL Every 12 Hours Scheduled 2/26/2025 --     Route: Intravenous     sodium chloride 0.9 % flush 10 mL 10 mL As Needed 2/26/2025 --     Route: Intravenous     sodium chloride 0.9 % flush 3 mL 3 mL Every 12 Hours Scheduled 2/27/2025 --     Route: Intravenous     sodium chloride 0.9 % flush 3-10 mL 3-10 mL As Needed 2/27/2025 --     Route: Intravenous     sodium chloride 0.9 % infusion 40 mL 40 mL As Needed 2/26/2025 --     Admin Instructions: Following administration of an IV intermittent medication, flush line with 40mL NS at 100mL/hr.     Route: Intravenous     sodium chloride 0.9 % infusion 40 mL 40 mL As Needed 2/27/2025 2/28/2025     Admin Instructions: Following administration of an IV intermittent medication, flush line with 40mL NS at 100mL/hr.     Route: Intravenous     thyroid (ARMOUR) tablet 90 mg ([MAR Hold] since 2/27/2025 10:44 AM) 90 mg Every Early Morning 2/27/2025 --     Route: Oral                Review of Systems   Constitutional: Negative.    HENT: Negative.     Eyes: Negative.    Respiratory: Negative.     Cardiovascular: Negative.    Gastrointestinal: Negative.    Endocrine: Negative.    Genitourinary: Negative.    Musculoskeletal: Negative.    Skin: Negative.    Allergic/Immunologic: Negative.    Neurological: Negative.    Hematological: Negative.    Psychiatric/Behavioral: Negative.     All other systems reviewed and are " "negative.        /65 (BP Location: Right arm, Patient Position: Lying)   Pulse 88   Temp 98.2 °F (36.8 °C) (Oral)   Resp 19   Ht 172.7 cm (68\")   Wt 115 kg (253 lb)   LMP 01/28/2025   SpO2 95%   BMI 38.47 kg/m²      Physical Exam  Vitals and nursing note reviewed.   Constitutional:       Appearance: She is well-developed.   HENT:      Head: Normocephalic and atraumatic.   Eyes:      General: No scleral icterus.     Pupils: Pupils are equal, round, and reactive to light.   Neck:      Thyroid: No thyromegaly.      Vascular: No carotid bruit or JVD.   Cardiovascular:      Rate and Rhythm: Normal rate and regular rhythm.      Pulses:           Carotid pulses are 2+ on the right side and 2+ on the left side.       Femoral pulses are 2+ on the right side and 2+ on the left side.       Popliteal pulses are 2+ on the right side and 2+ on the left side.        Dorsalis pedis pulses are 2+ on the right side and 2+ on the left side.        Posterior tibial pulses are 2+ on the right side and 2+ on the left side.      Heart sounds: Normal heart sounds.      Comments: Left leg swelling  Pulmonary:      Effort: Pulmonary effort is normal.      Breath sounds: Normal breath sounds.   Abdominal:      General: Bowel sounds are normal. There is no distension or abdominal bruit.      Palpations: Abdomen is soft. There is no mass.      Tenderness: There is no abdominal tenderness.   Musculoskeletal:         General: Normal range of motion.      Cervical back: Neck supple.      Left lower leg: Edema present.   Lymphadenopathy:      Cervical: No cervical adenopathy.   Skin:     General: Skin is warm and dry.   Neurological:      Mental Status: She is alert and oriented to person, place, and time.      Cranial Nerves: No cranial nerve deficit.      Sensory: No sensory deficit.            Laboratory Data:       Results from last 7 days   Lab Units 02/26/25  1755   WBC 10*3/mm3 12.59*   HEMOGLOBIN g/dL 13.6   HEMATOCRIT % 37.6 "   PLATELETS 10*3/mm3 263               Results from last 7 days   Lab Units 02/27/25  0428 02/26/25  1755   SODIUM mmol/L 134* 132*   POTASSIUM mmol/L 3.0* 3.0*   CHLORIDE mmol/L 97* 94*   CO2 mmol/L 23.0 22.0   BUN mg/dL 7 8   CREATININE mg/dL 0.65 0.73   CALCIUM mg/dL 8.5* 8.8   BILIRUBIN mg/dL  --  0.9   ALK PHOS U/L  --  92   ALT (SGPT) U/L  --  20   AST (SGOT) U/L  --  20   GLUCOSE mg/dL 105* 104*           Results from last 7 days   Lab Units 02/26/25  1755   PROTIME Seconds 13.6   INR   0.99            Diagnostic Data:  Imaging Results (Last 24 Hours)         Procedure Component Value Units Date/Time     IR Venogram Extremity [[id number]] Resulted: 02/27/25 1054       Updated: 02/27/25 1054     CT Angiogram Abdomen Pelvis [[id number]] Collected: 02/26/25 2041       Updated: 02/26/25 2048     Narrative:       EXAM: CT ANGIOGRAM ABDOMEN PELVIS- - 2/26/2025 7:15 PM     HISTORY: venous phasing for dvt- ordered by vascular; I82.4Y2-Acute  embolism and thrombosis of unspecified deep veins of left proximal lower  extremity       COMPARISON: None available.     Automatic exposure control was utilized to make radiation dose as low as  reasonably achievable.     TECHNIQUE: Enhanced axial images of the abdomen and pelvis obtained with  multiplanar reformats. 3D postprocessing, including MIPs, performed and  images saved to PACS.     FINDINGS:  VISUALIZED CHEST: No pericardial or pleural effusion is seen. Lung bases  are grossly clear.     LIVER/BILIARY: Hepatic parenchyma is unremarkable. Patient is status  post cholecystectomy. Bile ducts are unremarkable.     KIDNEYS/URETERS: Bilateral kidneys and ureters are unremarkable. There  is no hydronephrosis.     ADRENAL: Unremarkable.     SPLEEN: Unremarkable.     PANCREAS: Unremarkable.        PERITONEUM/RETROPERITONEUM: No free air, ascites, or lymphadenopathy.     GI TRACT: There is no bowel obstruction or mass.     PELVIS: Uterus and adnexa are unremarkable. Urinary  bladder is  incompletely distended. No free fluid or lymphadenopathy is seen.     SOFT TISSUES: There is edema in the left inguinal soft tissues and  subcutaneous soft tissues of the proximal left lower extremity.     BONES: No acute or aggressive bony lesion.            VESSELS:     AORTA/ABDOMINAL-PELVIC VESSELS: Abdominal aorta is unremarkable. No  aneurysm, stenosis, or occlusion is seen. Bilateral common iliac  arteries and bilateral internal and external iliac arteries are patent  without stenosis or occlusion.     There is enlargement of the proximal left external iliac vein with  filling defects consistent with deep venous thrombosis extends  anteriorly to the left common femoral vein and left superficial femoral  vein.              Impression:       1. Deep venous thrombosis on the left involving the external iliac vein,  common femoral vein, and superficial femoral vein and associated edema  in the subcutaneous soft tissues of the proximal left lower extremity  and left inguinal soft tissues.     This report was signed and finalized on 2/26/2025 8:45 PM by Giovanni Hernandez.        CT Angiogram Chest [[id number]] Collected: 02/26/25 2038       Updated: 02/26/25 2044     Narrative:       EXAM: CT ANGIOGRAM CHEST-      DATE: 2/26/2025 7:15 PM     HISTORY: Lower extremity DVT     COMPARISON: None available.     Automatic exposure control was utilized to make radiation dose as low as  reasonably achievable.     TECHNIQUE: Enhanced CT images of the chest obtained with multiplanar  reformats.     FINDINGS:     MEDIASTINUM/EXTRATHORACIC:   Thoracic aorta is unremarkable. There is no  significant coronary artery calcification. No pericardial or pleural  effusion is identified. No thoracic lymphadenopathy is seen.     PULMONARY ARTERIES: Pulmonary arteries are opacified and no filling  defects are seen.     LUNGS/AIRWAYS: There is mild scarring at the superior segment of the  left lower lobe. No pneumonia is seen.  Airways are unremarkable.        INCLUDED UPPER ABDOMEN: The visualized portions of the upper abdomen are  within normal limits. There is a small hiatal hernia.     SOFT TISSUES: Submitted soft tissues of the chest are unremarkable.     BONES: No suspicious osseous lesions identified.        Impression:       1. No pulmonary embolism identified or active disease in the chest.     This report was signed and finalized on 2/26/2025 8:41 PM by Giovanni Hernandez.        US Venous Doppler Lower Extremity Left (duplex) [[id number]] Resulted: 02/26/25 1824       Updated: 02/26/25 1848                Impression:    DVT (deep venous thrombosis)        Plan: After thoroughly evaluating Angie Evans, I believe the best course of action is to proceed with mechanical thrombectomy of her left lower extremity iliofemoral DVT.  Risk/benefits were explained at great length to the patient which include but not limited to bleeding, infection, vessel damage, nerve damage, and pulmonary embolus.  The patient understands the risks and wished for me to proceed.  Moving forward, the patient will need to be on oral anticoagulation for at least 3 to 6 months.  This was all discussed in full with complete understanding.  Thank you for allowing me to see Angie Evans in consult. Please feel free to reach out with any questions or concerns.     Eleazar Rubio, DO  2/27/2025  11:00 CST                  Lida Cartwright APRN   Nurse Practitioner  Vascular Surgery     Progress Notes      Signed     Date of Service: 02/28/25 1555  Creation Time: 02/28/25 1555     Signed       Expand All Collapse All        LOS: 2 days   Patient Care Team:  Erendira Finney APRN as PCP - General (Nurse Practitioner)     Chief Complaint:  left leg swelling        Subjective  Patient seen/examined with complaints of discomfort.  Left leg is swollen but much less tight than previous.  Popliteal dressing removed Steri-Strips intact.  AVSS.  Afebrile.   Potassium 3.2.        Objective[]Collapse by Default  Vital Signs  Temp:  [97.5 °F (36.4 °C)-99.3 °F (37.4 °C)] 97.9 °F (36.6 °C)  Heart Rate:  [78-97] 80  Resp:  [14-18] 14  BP: (110-134)/(58-72) 125/63     Physical Exam  Vitals and nursing note reviewed.   Constitutional:       General: She is not in acute distress.     Appearance: She is well-developed. She is not diaphoretic.   HENT:      Head: Normocephalic and atraumatic.   Eyes:      General: No scleral icterus.     Pupils: Pupils are equal, round, and reactive to light.   Neck:      Thyroid: No thyromegaly.      Vascular: No carotid bruit or JVD.   Cardiovascular:      Rate and Rhythm: Normal rate and regular rhythm.      Pulses: Normal pulses.      Heart sounds: Normal heart sounds and S2 normal. No murmur heard.     No friction rub. No gallop.   Pulmonary:      Effort: Pulmonary effort is normal.      Breath sounds: Normal breath sounds.   Abdominal:      General: Bowel sounds are normal.      Palpations: Abdomen is soft.   Musculoskeletal:         General: Normal range of motion.      Cervical back: Normal range of motion and neck supple.      Left lower leg: Edema present.      Comments: Left leg swelling but improved from yesterday Steri-Strips to popliteal area intact.  Leg rewrapped prior to discharge   Skin:     General: Skin is warm and dry.   Neurological:      Mental Status: She is alert and oriented to person, place, and time.      Cranial Nerves: No cranial nerve deficit.   Psychiatric:         Mood and Affect: Mood is anxious.         Behavior: Behavior normal.         Thought Content: Thought content normal.         Judgment: Judgment normal.            Laboratory Data:               Results from last 7 days   Lab Units 02/28/25  1404 02/26/25  1755   WBC 10*3/mm3 11.45* 12.59*   HEMOGLOBIN g/dL 10.8* 13.6   HEMATOCRIT % 31.3* 37.6   PLATELETS 10*3/mm3 254 263                 Results from last 7 days   Lab Units 02/28/25  1404 02/27/25  1933  02/27/25  0428 02/26/25  1755   SODIUM mmol/L 135*  --  134* 132*   POTASSIUM mmol/L 3.2* 3.6 3.0* 3.0*   CHLORIDE mmol/L 100  --  97* 94*   CO2 mmol/L 24.0  --  23.0 22.0   BUN mg/dL 8  --  7 8   CREATININE mg/dL 0.75  --  0.65 0.73   CALCIUM mg/dL 8.6  --  8.5* 8.8   BILIRUBIN mg/dL  --   --   --  0.9   ALK PHOS U/L  --   --   --  92   ALT (SGPT) U/L  --   --   --  20   AST (SGOT) U/L  --   --   --  20   GLUCOSE mg/dL 140*  --  105* 104*           Results from last 7 days   Lab Units 02/26/25  1755   PROTIME Seconds 13.6   INR   0.99               Medication Review: reviewed        Assessment & Plan    Acute deep vein thrombosis (DVT) of left iliofemoral vein  Status post DVT thrombectomy day #1     Plan:  Ok for discharge.   May remove ACE wrap.  Compression stocking at discharge  Follow up with me 2 weeks.   Ok to switch to oral anticoagulation     EVER Ham  Vascular Surgery  [phone]  02/28/25  15:55 CST                  Encounter Date    2/26/25    US Venous Doppler Lower Extremity Left (duplex) [[id number]] (Order [id number])  Order  Status: Final result     Study Notes     Kelly Proctor on 2/26/2025  6:47 PM CST   Lt LEV prelim. (+) DVT. Thrombus noted in Lt CFV, SFJ, FV (prox and mid. Unable to visualize distal FV due to edema and body habitus), Pop V, Gastroc V., PTV and Peroneal V. Informed ordering provider of preliminary results. Final report pending.     Appointment Information    PACS Images     Radiology Images  Study Result    Narrative & Impression   History: Swelling     IMPRESSION:  Impression: DVT noted in the left common femoral vein, saphenofemoral  junction, superficial femoral vein, popliteal vein, and tibial veins.     Comments: Left lower extremity venous duplex exam was performed using  color Doppler flow, Doppler wave form analysis, and grayscale imaging,  with and without compression. There is occlusive thrombus of the left  common femoral vein. This extends down through  the popliteal vein and  into the tibial veins.        This report was signed and finalized on 2/27/2025 5:02 PM by Arthur Rose.          Basic Metabolic Panel [LAB15] (Order [id number])  Order  Date: 2/28/2025 Department: [address] Released By/Authorizing: Raul Kathleen MD (auto-released)     Reprint Order Requisition    Basic Metabolic Panel (Order #[id number]) on 2/28/25         Contains abnormal data Basic Metabolic Panel  Order: [id number]  Status: Final result       Visible to patient: No (not released)       Next appt: 03/14/2025 at 02:15 PM in Vascular Surgery ([facility])    Specimen Information: Blood   0 Result Notes            Component  Ref Range & Units 3 d ago  (2/28/25) 4 d ago  (2/27/25) 4 d ago  (2/27/25) 5 d ago  (2/26/25) 3 yr ago  (7/8/21)   Glucose  65 - 99 mg/dL 140 High   105 High  104 High  115 High    BUN  6 - 20 mg/dL 8  7 8 7   Creatinine  0.57 - 1.00 mg/dL 0.75  0.65 0.73 0.93   Sodium  136 - 145 mmol/L 135 Low   134 Low  132 Low  130 Low    Potassium  3.5 - 5.2 mmol/L 3.2 Low  3.6 3.0 Low  3.0 Low  3.2 Low    Chloride  98 - 107 mmol/L 100            CBC (No Diff) [[id number]] (Order [id number])  Order  Date: 2/28/2025 Department: [address] Released By/Authorizing: Raul Kathleen MD (auto-released)     Reprint Order Requisition    CBC (No Diff) (Order #[id number]) on 2/28/25         Contains abnormal data CBC (No Diff)  Order: [id number]  Status: Final result       Visible to patient: No (not released)       Next appt: 03/14/2025 at 02:15 PM in Vascular Surgery (Lida Jarrettter, APRN)    Specimen Information: Blood   0 Result Notes        Component  Ref Range & Units 3 d ago 5 d ago 3 yr ago   WBC  3.40 - 10.80 10*3/mm3 11.45 High  12.59 High  2.93 Low    RBC  3.77 - 5.28 10*6/mm3 3.50 Low  4.33 4.89   Hemoglobin  12.0 - 15.9 g/dL 10.8 Low  13.6 14.9   Hematocrit  34.0 - 46.6 % 31.3 Low  37.6 41.1   MCV  79.0 - 97.0 fL 89.4  86.8 84.0   MCH  26.6 - 33.0 pg 30.9 31.4 30.5   MCHC  31.5 - 35.7 g/dL 34.5 36.2 High  36.3 High    RDW  12.3 - 15.4 % 14.5 14.4 13.2   RDW-SD  37.0 - 54.0 fl 46.8 46.0 40.6   MPV  6.0 - 12.0 fL 9.3 9.3 9.6   Platelets  140 - 450 10*3/mm3 254            No current facility-administered medications for this encounter.     Current Outpatient Medications   Medication Sig Dispense Refill    apixaban (ELIQUIS) 5 MG tablet tablet Take 1 tablet by mouth Every 12 (Twelve) Hours. Indications: DVT/PE (active thrombosis) 60 tablet 1    cetirizine (zyrTEC) 5 MG tablet Take 1 tablet by mouth Daily.      HYDROcodone-acetaminophen (NORCO) 5-325 MG per tablet Take 1 tablet by mouth Every 6 (Six) Hours As Needed for Moderate Pain. 5 tablet 0    losartan (COZAAR) 50 MG tablet Take 1 tablet by mouth 2 (Two) Times a Day.      nystatin (MYCOSTATIN) 879019 UNIT/GM ointment Apply 1 Application topically to the appropriate area as directed 2 (Two) Times a Day. (Patient taking differently: Apply 1 Application topically to the appropriate area as directed As Needed.)      THYROID 90 MG PO tablet Take 1 tablet by mouth Daily.      albuterol sulfate  (90 Base) MCG/ACT inhaler Inhale 2 puffs Every 4 (Four) Hours As Needed for Wheezing.

## 2025-03-06 ENCOUNTER — TELEPHONE (OUTPATIENT)
Dept: VASCULAR SURGERY | Facility: CLINIC | Age: 50
End: 2025-03-06

## 2025-03-06 ENCOUNTER — OFFICE VISIT (OUTPATIENT)
Dept: VASCULAR SURGERY | Facility: CLINIC | Age: 50
End: 2025-03-06
Payer: COMMERCIAL

## 2025-03-06 VITALS
BODY MASS INDEX: 41.83 KG/M2 | HEIGHT: 64 IN | HEART RATE: 96 BPM | DIASTOLIC BLOOD PRESSURE: 82 MMHG | SYSTOLIC BLOOD PRESSURE: 144 MMHG | WEIGHT: 245 LBS | OXYGEN SATURATION: 98 %

## 2025-03-06 DIAGNOSIS — I10 PRIMARY HYPERTENSION: ICD-10-CM

## 2025-03-06 DIAGNOSIS — I82.422 ACUTE DEEP VEIN THROMBOSIS (DVT) OF LEFT ILIOFEMORAL VEIN: Primary | ICD-10-CM

## 2025-03-06 PROCEDURE — 99213 OFFICE O/P EST LOW 20 MIN: CPT | Performed by: NURSE PRACTITIONER

## 2025-03-06 NOTE — TELEPHONE ENCOUNTER
Caller: Angie Evans    Relationship: Self    Best call back number: [phone]     What is the best time to reach you: ANYTIME    Who are you requesting to speak with (clinical staff, provider,  specific staff member): MÑEO ROWELL IF AVAILABLE     Do you know the name of the person who called: PT    What was the call regarding: PT IS CALLING WITH CONCERNS AND QUESTIONS SHE WOULD LIKE TO SPEAK WITH SORIN. SORIN TOLD HER AT DISCHARGE IF SHE HAD QUESTIONS TO CALL HER.   SHE IS CONCERNED ABOUT SWELLING SHE IS WEARING HER COMPRESSION SOCKS EXCEPT AT NIGHT AND IS WALKING ABOUT 4000 STEPS A DAY. SHE IS EXPERIENCING SWELLING ON BOTH SIDES OF THE KNEE LOCATED MORE SO ON THE INNER THIGH AND OUTSIDE THIGH AREA IT FEELS PRETTY TIGHT AND BY THE END OF THE DAY IT IS VERY TIGHT AND SORE. SHE IS ALSO EXPERIENCING PAIN UP THE BACK OF HER THIGH AREA. SHE DESCRIBES IT AS AN ACHE FEELING. SHE IS VERY WORRIED AND WOULD FEEL MORE COMFORTABLE IF SHE COULD TALK WITH SOMEONE ABOUT IF THIS IS NORMAL. PLEASE CALL THE PT BACK ASAP THANK YOU  Is it okay if the provider responds through [employer]: NO

## 2025-03-06 NOTE — PROGRESS NOTES
"03/06/2025      Erendira Finney, APRN  [address] [address]        Angie Evans  1975    Chief Complaint   Patient presents with    Follow-up     Patient is here due to swelling, tightness and achy feeling after DVT Thrombolysis of left leg done 2/27/25.        Dear Erendira Finney, APRN:    HPI     I had the pleasure of seeing your patient in the office today for follow up.  As you recall, the patient is a 49 y.o. female who we recently saw while hospitalized after she presented to the emergency department with acute onset of left lower extremity swelling, pain, and redness.  She did have recent immobilization due to injury in her foot and also has a sedentary job as well as travels an hour to and from work.  She had testing in the emergency department revealing a left lower extremity iliofemoral DVT.  She did undergo mechanical thrombectomy of the left lower extremity and coil embolization of a superficial femoral vein sidebranch on 2/27/2025.  She was discharged the following day and as she has been up more she has noticed that swelling has not improved to her thigh.  She is wearing compression stockings to below the knee.  She wanted to come in for evaluation to make sure everything was okay.  She was discharged on Eliquis.    Review of Systems   Constitutional: Negative.    HENT: Negative.     Eyes: Negative.    Respiratory: Negative.     Cardiovascular:  Positive for leg swelling.   Gastrointestinal: Negative.    Endocrine: Negative.    Genitourinary: Negative.    Musculoskeletal: Negative.    Skin: Negative.    Allergic/Immunologic: Negative.    Neurological: Negative.    Hematological: Negative.    Psychiatric/Behavioral:  The patient is nervous/anxious.        /82   Pulse 96   Ht 162.6 cm (64\")   Wt 111 kg (245 lb)   SpO2 98%   BMI 42.05 kg/m²   Physical Exam  Vitals and nursing note reviewed.   Constitutional:       General: She is not in acute distress.     " Appearance: She is well-developed. She is not diaphoretic.   HENT:      Head: Normocephalic and atraumatic.   Eyes:      General: No scleral icterus.     Pupils: Pupils are equal, round, and reactive to light.   Neck:      Thyroid: No thyromegaly.      Vascular: No carotid bruit or JVD.   Cardiovascular:      Rate and Rhythm: Normal rate and regular rhythm.      Pulses: Normal pulses.      Heart sounds: Normal heart sounds and S2 normal. No murmur heard.     No friction rub. No gallop.   Pulmonary:      Effort: Pulmonary effort is normal.      Breath sounds: Normal breath sounds.   Abdominal:      General: Bowel sounds are normal.      Palpations: Abdomen is soft.   Musculoskeletal:         General: Normal range of motion.      Cervical back: Normal range of motion and neck supple.      Left lower leg: Edema present.   Skin:     General: Skin is warm and dry.   Neurological:      Mental Status: She is alert and oriented to person, place, and time.      Cranial Nerves: No cranial nerve deficit.   Psychiatric:         Behavior: Behavior normal.         Thought Content: Thought content normal.         Judgment: Judgment normal.         DIAGNOSTIC DATA:    US Guided Vascular Access    Result Date: 3/3/2025  Narrative: HISTORY: Left lower extremity DVT.  Comments: Grayscale imaging as well as color flow duplex were used to evaluate percutaneous cannulation of the left popliteal vein.  Under ultrasound guidance, and using a micropuncture technique, the left popliteal vein was successfully cannulated.      Impression: Successful ultrasound-guided cannulation of the left popliteal vein.  This report was signed and finalized on 3/3/2025 3:33 PM by Dr. Eleazar Rubio MD.      US Venous Doppler Lower Extremity Left (duplex)    Result Date: 2/27/2025  Narrative: History: Swelling      Impression: Impression: DVT noted in the left common femoral vein, saphenofemoral junction, superficial femoral vein, popliteal vein, and  tibial veins.  Comments: Left lower extremity venous duplex exam was performed using color Doppler flow, Doppler wave form analysis, and grayscale imaging, with and without compression. There is occlusive thrombus of the left common femoral vein. This extends down through the popliteal vein and into the tibial veins.   This report was signed and finalized on 2/27/2025 5:02 PM by Arthur Rose.      FL C Arm During Surgery    Result Date: 2/27/2025  Narrative: This procedure was auto-finalized with no dictation required.    CT Angiogram Abdomen Pelvis    Result Date: 2/26/2025  Narrative: EXAM: CT ANGIOGRAM ABDOMEN PELVIS- - 2/26/2025 7:15 PM  HISTORY: venous phasing for dvt- ordered by vascular; I82.4Y2-Acute embolism and thrombosis of unspecified deep veins of left proximal lower extremity   COMPARISON: None available.  Automatic exposure control was utilized to make radiation dose as low as reasonably achievable.  TECHNIQUE: Enhanced axial images of the abdomen and pelvis obtained with multiplanar reformats. 3D postprocessing, including MIPs, performed and images saved to PACS.  FINDINGS: VISUALIZED CHEST: No pericardial or pleural effusion is seen. Lung bases are grossly clear.  LIVER/BILIARY: Hepatic parenchyma is unremarkable. Patient is status post cholecystectomy. Bile ducts are unremarkable.  KIDNEYS/URETERS: Bilateral kidneys and ureters are unremarkable. There is no hydronephrosis.  ADRENAL: Unremarkable.  SPLEEN: Unremarkable.  PANCREAS: Unremarkable.   PERITONEUM/RETROPERITONEUM: No free air, ascites, or lymphadenopathy.  GI TRACT: There is no bowel obstruction or mass.  PELVIS: Uterus and adnexa are unremarkable. Urinary bladder is incompletely distended. No free fluid or lymphadenopathy is seen.  SOFT TISSUES: There is edema in the left inguinal soft tissues and subcutaneous soft tissues of the proximal left lower extremity.  BONES: No acute or aggressive bony lesion.    VESSELS:   AORTA/ABDOMINAL-PELVIC VESSELS: Abdominal aorta is unremarkable. No aneurysm, stenosis, or occlusion is seen. Bilateral common iliac arteries and bilateral internal and external iliac arteries are patent without stenosis or occlusion.  There is enlargement of the proximal left external iliac vein with filling defects consistent with deep venous thrombosis extends anteriorly to the left common femoral vein and left superficial femoral vein.        Impression: 1. Deep venous thrombosis on the left involving the external iliac vein, common femoral vein, and superficial femoral vein and associated edema in the subcutaneous soft tissues of the proximal left lower extremity and left inguinal soft tissues.  This report was signed and finalized on 2/26/2025 8:45 PM by Giovanni Hernandez.      CT Angiogram Chest    Result Date: 2/26/2025  Narrative: EXAM: CT ANGIOGRAM CHEST-  DATE: 2/26/2025 7:15 PM  HISTORY: Lower extremity DVT  COMPARISON: None available.  Automatic exposure control was utilized to make radiation dose as low as reasonably achievable.  TECHNIQUE: Enhanced CT images of the chest obtained with multiplanar reformats.  FINDINGS:  MEDIASTINUM/EXTRATHORACIC:   Thoracic aorta is unremarkable. There is no significant coronary artery calcification. No pericardial or pleural effusion is identified. No thoracic lymphadenopathy is seen.  PULMONARY ARTERIES: Pulmonary arteries are opacified and no filling defects are seen.  LUNGS/AIRWAYS: There is mild scarring at the superior segment of the left lower lobe. No pneumonia is seen. Airways are unremarkable.   INCLUDED UPPER ABDOMEN: The visualized portions of the upper abdomen are within normal limits. There is a small hiatal hernia.  SOFT TISSUES: Submitted soft tissues of the chest are unremarkable.  BONES: No suspicious osseous lesions identified.      Impression: 1. No pulmonary embolism identified or active disease in the chest.  This report was signed and finalized on  2/26/2025 8:41 PM by Giovanni Hernandez.       Patient Active Problem List   Diagnosis    Acute deep vein thrombosis (DVT) of left iliofemoral vein         ICD-10-CM ICD-9-CM   1. Acute deep vein thrombosis (DVT) of left iliofemoral vein  I82.422 453.41   2. Primary hypertension  I10 401.9           PLAN: After thoroughly evaluating Angie Evans, I believe the best course of action is to remain conservative from vascular surgery standpoint.  She was discharged and concerned with swelling to her thigh.  The swelling has not increased there is no warmth or erythema noted.  I did explain that this would take some time to improve.  Swelling is better than prior to surgery.  She does continue with compression stockings below the knee.  I explained she could wrap above the knee which may help with some of her swelling.  I will see her back at her regularly scheduled postoperative appointment and she will call me if anything changes.  She should continue her Eliquis 5 mg twice daily which will need to be further prescribed by her PCP.  The patient is to continue taking their medications as previously discussed.   Body mass index is 42.05 kg/m²..  Class 3 Severe Obesity (BMI >=40). Obesity-related health conditions include the following: hypertension. Obesity is unchanged. BMI is is above average; BMI management plan is completed. We discussed portion control and increasing exercise.  .  This was all discussed in full with complete understanding.  Thank you for allowing me to participate in the care of your patient.  Please do not hesitate to call with any questions or concerns.  We will keep you aware of any further encounters with Angie Evans.      Sincerely Yours,      EVER Ham

## 2025-03-06 NOTE — LETTER
March 6, 2025     EVER Casey  [address] [facility] [address]  [place] [address]    Patient: Angie Evans   YOB: 1975   Date of Visit: 3/6/2025     Dear EVER Casey:       Thank you for referring Angie Evans to me for evaluation. Below are the relevant portions of my assessment and plan of care.    If you have questions, please do not hesitate to call me. I look forward to following Angie along with you.         Sincerely,        EVER Ham        CC: No Recipients    Lida Cartwright APRN  03/06/25 1732  Sign when Signing Visit  03/06/2025      Erendira Finney APRN  [address] [facility]  [place] [address]        Angie Evans  1975    Chief Complaint   Patient presents with   • Follow-up     Patient is here due to swelling, tightness and achy feeling after DVT Thrombolysis of left leg done 2/27/25.        Dear Erendira Finney APRN:    HPI     I had the pleasure of seeing your patient in the office today for follow up.  As you recall, the patient is a 49 y.o. female who we recently saw while hospitalized after she presented to the emergency department with acute onset of left lower extremity swelling, pain, and redness.  She did have recent immobilization due to injury in her foot and also has a sedentary job as well as travels an hour to and from work.  She had testing in the emergency department revealing a left lower extremity iliofemoral DVT.  She did undergo mechanical thrombectomy of the left lower extremity and coil embolization of a superficial femoral vein sidebranch on 2/27/2025.  She was discharged the following day and as she has been up more she has noticed that swelling has not improved to her thigh.  She is wearing compression stockings to below the knee.  She wanted to come in for evaluation to make sure everything was okay.  She was discharged on Eliquis.    Review of Systems   Constitutional: Negative.    HENT: Negative.     Eyes: Negative.   "  Respiratory: Negative.     Cardiovascular:  Positive for leg swelling.   Gastrointestinal: Negative.    Endocrine: Negative.    Genitourinary: Negative.    Musculoskeletal: Negative.    Skin: Negative.    Allergic/Immunologic: Negative.    Neurological: Negative.    Hematological: Negative.    Psychiatric/Behavioral:  The patient is nervous/anxious.        /82   Pulse 96   Ht 162.6 cm (64\")   Wt 111 kg (245 lb)   SpO2 98%   BMI 42.05 kg/m²   Physical Exam  Vitals and nursing note reviewed.   Constitutional:       General: She is not in acute distress.     Appearance: She is well-developed. She is not diaphoretic.   HENT:      Head: Normocephalic and atraumatic.   Eyes:      General: No scleral icterus.     Pupils: Pupils are equal, round, and reactive to light.   Neck:      Thyroid: No thyromegaly.      Vascular: No carotid bruit or JVD.   Cardiovascular:      Rate and Rhythm: Normal rate and regular rhythm.      Pulses: Normal pulses.      Heart sounds: Normal heart sounds and S2 normal. No murmur heard.     No friction rub. No gallop.   Pulmonary:      Effort: Pulmonary effort is normal.      Breath sounds: Normal breath sounds.   Abdominal:      General: Bowel sounds are normal.      Palpations: Abdomen is soft.   Musculoskeletal:         General: Normal range of motion.      Cervical back: Normal range of motion and neck supple.      Left lower leg: Edema present.   Skin:     General: Skin is warm and dry.   Neurological:      Mental Status: She is alert and oriented to person, place, and time.      Cranial Nerves: No cranial nerve deficit.   Psychiatric:         Behavior: Behavior normal.         Thought Content: Thought content normal.         Judgment: Judgment normal.         DIAGNOSTIC DATA:    US Guided Vascular Access    Result Date: 3/3/2025  Narrative: HISTORY: Left lower extremity DVT.  Comments: Grayscale imaging as well as color flow duplex were used to evaluate percutaneous cannulation " of the left popliteal vein.  Under ultrasound guidance, and using a micropuncture technique, the left popliteal vein was successfully cannulated.      Impression: Successful ultrasound-guided cannulation of the left popliteal vein.  This report was signed and finalized on 3/3/2025 3:33 PM by Dr. Eleazar Rubio MD.      US Venous Doppler Lower Extremity Left (duplex)    Result Date: 2/27/2025  Narrative: History: Swelling      Impression: Impression: DVT noted in the left common femoral vein, saphenofemoral junction, superficial femoral vein, popliteal vein, and tibial veins.  Comments: Left lower extremity venous duplex exam was performed using color Doppler flow, Doppler wave form analysis, and grayscale imaging, with and without compression. There is occlusive thrombus of the left common femoral vein. This extends down through the popliteal vein and into the tibial veins.   This report was signed and finalized on 2/27/2025 5:02 PM by Arthur Rose.      FL C Arm During Surgery    Result Date: 2/27/2025  Narrative: This procedure was auto-finalized with no dictation required.    CT Angiogram Abdomen Pelvis    Result Date: 2/26/2025  Narrative: EXAM: CT ANGIOGRAM ABDOMEN PELVIS- - 2/26/2025 7:15 PM  HISTORY: venous phasing for dvt- ordered by vascular; I82.4Y2-Acute embolism and thrombosis of unspecified deep veins of left proximal lower extremity   COMPARISON: None available.  Automatic exposure control was utilized to make radiation dose as low as reasonably achievable.  TECHNIQUE: Enhanced axial images of the abdomen and pelvis obtained with multiplanar reformats. 3D postprocessing, including MIPs, performed and images saved to PACS.  FINDINGS: VISUALIZED CHEST: No pericardial or pleural effusion is seen. Lung bases are grossly clear.  LIVER/BILIARY: Hepatic parenchyma is unremarkable. Patient is status post cholecystectomy. Bile ducts are unremarkable.  KIDNEYS/URETERS: Bilateral kidneys and ureters are  unremarkable. There is no hydronephrosis.  ADRENAL: Unremarkable.  SPLEEN: Unremarkable.  PANCREAS: Unremarkable.   PERITONEUM/RETROPERITONEUM: No free air, ascites, or lymphadenopathy.  GI TRACT: There is no bowel obstruction or mass.  PELVIS: Uterus and adnexa are unremarkable. Urinary bladder is incompletely distended. No free fluid or lymphadenopathy is seen.  SOFT TISSUES: There is edema in the left inguinal soft tissues and subcutaneous soft tissues of the proximal left lower extremity.  BONES: No acute or aggressive bony lesion.    VESSELS:  AORTA/ABDOMINAL-PELVIC VESSELS: Abdominal aorta is unremarkable. No aneurysm, stenosis, or occlusion is seen. Bilateral common iliac arteries and bilateral internal and external iliac arteries are patent without stenosis or occlusion.  There is enlargement of the proximal left external iliac vein with filling defects consistent with deep venous thrombosis extends anteriorly to the left common femoral vein and left superficial femoral vein.        Impression: 1. Deep venous thrombosis on the left involving the external iliac vein, common femoral vein, and superficial femoral vein and associated edema in the subcutaneous soft tissues of the proximal left lower extremity and left inguinal soft tissues.  This report was signed and finalized on 2/26/2025 8:45 PM by Giovanni Hernandez.      CT Angiogram Chest    Result Date: 2/26/2025  Narrative: EXAM: CT ANGIOGRAM CHEST-  DATE: 2/26/2025 7:15 PM  HISTORY: Lower extremity DVT  COMPARISON: None available.  Automatic exposure control was utilized to make radiation dose as low as reasonably achievable.  TECHNIQUE: Enhanced CT images of the chest obtained with multiplanar reformats.  FINDINGS:  MEDIASTINUM/EXTRATHORACIC:   Thoracic aorta is unremarkable. There is no significant coronary artery calcification. No pericardial or pleural effusion is identified. No thoracic lymphadenopathy is seen.  PULMONARY ARTERIES: Pulmonary arteries  are opacified and no filling defects are seen.  LUNGS/AIRWAYS: There is mild scarring at the superior segment of the left lower lobe. No pneumonia is seen. Airways are unremarkable.   INCLUDED UPPER ABDOMEN: The visualized portions of the upper abdomen are within normal limits. There is a small hiatal hernia.  SOFT TISSUES: Submitted soft tissues of the chest are unremarkable.  BONES: No suspicious osseous lesions identified.      Impression: 1. No pulmonary embolism identified or active disease in the chest.  This report was signed and finalized on 2/26/2025 8:41 PM by Giovanni Hernandez.       Patient Active Problem List   Diagnosis   • Acute deep vein thrombosis (DVT) of left iliofemoral vein         ICD-10-CM ICD-9-CM   1. Acute deep vein thrombosis (DVT) of left iliofemoral vein  I82.422 453.41   2. Primary hypertension  I10 401.9           PLAN: After thoroughly evaluating Angie Evans, I believe the best course of action is to remain conservative from vascular surgery standpoint.  She was discharged and concerned with swelling to her thigh.  The swelling has not increased there is no warmth or erythema noted.  I did explain that this would take some time to improve.  Swelling is better than prior to surgery.  She does continue with compression stockings below the knee.  I explained she could wrap above the knee which may help with some of her swelling.  I will see her back at her regularly scheduled postoperative appointment and she will call me if anything changes.  She should continue her Eliquis 5 mg twice daily which will need to be further prescribed by her PCP.  The patient is to continue taking their medications as previously discussed.   Body mass index is 42.05 kg/m²..  Class 3 Severe Obesity (BMI >=40). Obesity-related health conditions include the following: hypertension. Obesity is unchanged. BMI is is above average; BMI management plan is completed. We discussed portion control and increasing  exercise.  .  This was all discussed in full with complete understanding.  Thank you for allowing me to participate in the care of your patient.  Please do not hesitate to call with any questions or concerns.  We will keep you aware of any further encounters with Angie Evans.      Sincerely Yours,      EVER Ham

## 2025-03-11 ENCOUNTER — TELEPHONE (OUTPATIENT)
Dept: VASCULAR SURGERY | Facility: CLINIC | Age: 50
End: 2025-03-11

## 2025-03-11 NOTE — TELEPHONE ENCOUNTER
Caller: Evans Angie    Relationship: Self    Best call back number: [phone]     What orders are you requesting (i.e. lab or imaging): US    In what timeframe would the patient need to come in: PT HAS F/U ON 3.14.25    Where will you receive your lab/imaging services: [facility]    Additional notes: PT SAID SORIN TOLD HER THAT IF SHE WANTED TO GET TESTING DONE WHICH SHE THOUGHT WAS AN US BEFORE APT ON FRIDAY TO CALL AND LET HER KNOW AND SHE WOULD ORDER IT. PT CALLED AND SAID SHE WANTED TO HAVE THIS DONE. PLEASE CALL PT TO ADVISE ON IF THESE COULD BE SCHED BEFORE HER APT STILL AND IF SO ON HOW SCHEDULING WOULD GO. PT ALSO SAID HER HR REP SENT FMLA PAPERWORK ON MONDAY VIA FAX AND WANTED TO CONFIRM WE RECEIVED THEM.

## 2025-03-12 ENCOUNTER — TELEPHONE (OUTPATIENT)
Dept: VASCULAR SURGERY | Facility: CLINIC | Age: 50
End: 2025-03-12
Payer: COMMERCIAL

## 2025-03-12 DIAGNOSIS — I82.422 ACUTE DEEP VEIN THROMBOSIS (DVT) OF LEFT ILIOFEMORAL VEIN: Primary | ICD-10-CM

## 2025-03-12 NOTE — TELEPHONE ENCOUNTER
Call placed and notified patient of need to be at heart center on Friday at 1030 for testing then to office for an appointment and she has voiced understanding

## 2025-03-14 ENCOUNTER — HOSPITAL ENCOUNTER (OUTPATIENT)
Dept: ULTRASOUND IMAGING | Facility: HOSPITAL | Age: 50
Discharge: HOME | End: 2025-03-14
Admitting: NURSE PRACTITIONER
Payer: COMMERCIAL

## 2025-03-14 ENCOUNTER — OFFICE VISIT (OUTPATIENT)
Dept: VASCULAR SURGERY | Facility: CLINIC | Age: 50
End: 2025-03-14
Payer: COMMERCIAL

## 2025-03-14 VITALS
DIASTOLIC BLOOD PRESSURE: 90 MMHG | RESPIRATION RATE: 18 BRPM | BODY MASS INDEX: 41.66 KG/M2 | WEIGHT: 244 LBS | OXYGEN SATURATION: 98 % | HEIGHT: 64 IN | SYSTOLIC BLOOD PRESSURE: 160 MMHG

## 2025-03-14 DIAGNOSIS — I82.422 ACUTE DEEP VEIN THROMBOSIS (DVT) OF LEFT ILIOFEMORAL VEIN: ICD-10-CM

## 2025-03-14 DIAGNOSIS — I82.422 ACUTE DEEP VEIN THROMBOSIS (DVT) OF LEFT ILIOFEMORAL VEIN: Primary | ICD-10-CM

## 2025-03-14 DIAGNOSIS — I10 PRIMARY HYPERTENSION: ICD-10-CM

## 2025-03-14 PROCEDURE — 99214 OFFICE O/P EST MOD 30 MIN: CPT | Performed by: NURSE PRACTITIONER

## 2025-03-14 PROCEDURE — 93971 EXTREMITY STUDY: CPT

## 2025-03-14 RX ORDER — POTASSIUM CHLORIDE 750 MG/1
10 TABLET, EXTENDED RELEASE ORAL 2 TIMES DAILY
COMMUNITY

## 2025-03-14 NOTE — LETTER
March 26, 2025     EVER Casey  [address] [facility]  [place] [address]    Patient: Angie Evans   YOB: 1975   Date of Visit: 3/14/2025     Dear EVER Casey:       Thank you for referring Angie Evans to me for evaluation. Below are the relevant portions of my assessment and plan of care.    If you have questions, please do not hesitate to call me. I look forward to following Angie along with you.         Sincerely,        EVER Ham        CC: No Recipients    Lida Cartwright APRN  03/26/25 2044  Sign when Signing Visit  03/14/2025       Erendira Finney APRN  [address] [facility]  [place] [address]        Angie Evans  1975    Chief Complaint   Patient presents with   • Post-op     DVT THROMBOLYSIS. Two week post op.        Dear Erendira Finney APRN:    HPI     I had the pleasure of seeing your patient in the office today for follow up.  As you recall, the patient is a 50 y.o. female who we recently saw while hospitalized after she presented to the emergency department with acute onset of left lower extremity swelling, pain, and redness.  She did have recent immobilization due to injury in her foot and also has a sedentary job as well as travels an hour to and from work.  She had testing in the emergency department revealing a left lower extremity iliofemoral DVT.  She did undergo mechanical thrombectomy of the left lower extremity and coil embolization of a superficial femoral vein sidebranch on 2/27/2025.  She does continue to have swelling to her thigh however this is much improved since last week.  She is maintained on Eliquis.  She is also wearing compression stockings.  She did have noninvasive testing performed today, which I did review in office.      Review of Systems   Constitutional: Negative.    HENT: Negative.     Eyes: Negative.    Respiratory: Negative.     Cardiovascular:  Positive for leg swelling.   Gastrointestinal: Negative.   "  Endocrine: Negative.    Genitourinary: Negative.    Musculoskeletal: Negative.    Skin: Negative.    Allergic/Immunologic: Negative.    Neurological: Negative.    Hematological: Negative.    Psychiatric/Behavioral:  The patient is nervous/anxious.    All other systems reviewed and are negative.      /90   Resp 18   Ht 162.6 cm (64\")   Wt 111 kg (244 lb)   SpO2 98%   BMI 41.88 kg/m²   Physical Exam  Vitals and nursing note reviewed.   Constitutional:       General: She is not in acute distress.     Appearance: Normal appearance. She is well-developed and well-groomed. She is morbidly obese. She is not diaphoretic.   HENT:      Head: Normocephalic and atraumatic.   Eyes:      General: No scleral icterus.     Pupils: Pupils are equal, round, and reactive to light.   Neck:      Thyroid: No thyromegaly.      Vascular: No carotid bruit or JVD.   Cardiovascular:      Rate and Rhythm: Normal rate and regular rhythm.      Pulses: Normal pulses.      Heart sounds: Normal heart sounds and S2 normal. No murmur heard.     No friction rub. No gallop.   Pulmonary:      Effort: Pulmonary effort is normal.      Breath sounds: Normal breath sounds.   Abdominal:      General: Bowel sounds are normal.      Palpations: Abdomen is soft.   Musculoskeletal:         General: Normal range of motion.      Cervical back: Normal range of motion and neck supple.      Left lower leg: Edema present.   Skin:     General: Skin is warm and dry.   Neurological:      General: No focal deficit present.      Mental Status: She is alert and oriented to person, place, and time.      Cranial Nerves: No cranial nerve deficit.   Psychiatric:         Mood and Affect: Mood normal.         Behavior: Behavior normal. Behavior is cooperative.         Thought Content: Thought content normal.         Judgment: Judgment normal.         DIAGNOSTIC DATA:    US Venous Doppler Lower Extremity Left (duplex)  Result Date: 3/14/2025  Narrative: History: " Swelling with history of DVT      Impression: Impression: There is extensive DVT from the proximal superficial vein extending into the tibial veins. Bilateral common femoral veins are patent.  Comments: Left lower extremity venous duplex exam was performed using color Doppler flow, Doppler wave form analysis, and grayscale imaging, with and without compression. There is evidence of DVT in the proximal superficial vein that extends into the tibial veins in the left lower extremity. The bilateral common femoral veins exhibit color flow and are compressible.. There is no thrombus identified in the saphenofemoral junction or the greater saphenous vein.   This report was signed and finalized on 3/14/2025 8:29 PM by Arthur Rose.      US Guided Vascular Access  Result Date: 3/3/2025  Narrative: HISTORY: Left lower extremity DVT.  Comments: Grayscale imaging as well as color flow duplex were used to evaluate percutaneous cannulation of the left popliteal vein.  Under ultrasound guidance, and using a micropuncture technique, the left popliteal vein was successfully cannulated.      Impression: Successful ultrasound-guided cannulation of the left popliteal vein.  This report was signed and finalized on 3/3/2025 3:33 PM by Dr. Eleazar Rubio MD.      US Venous Doppler Lower Extremity Left (duplex)  Result Date: 2/27/2025  Narrative: History: Swelling      Impression: Impression: DVT noted in the left common femoral vein, saphenofemoral junction, superficial femoral vein, popliteal vein, and tibial veins.  Comments: Left lower extremity venous duplex exam was performed using color Doppler flow, Doppler wave form analysis, and grayscale imaging, with and without compression. There is occlusive thrombus of the left common femoral vein. This extends down through the popliteal vein and into the tibial veins.   This report was signed and finalized on 2/27/2025 5:02 PM by Arthur Rose.      FL C Arm During Surgery  Result  Date: 2/27/2025  Narrative: This procedure was auto-finalized with no dictation required.    CT Angiogram Abdomen Pelvis  Result Date: 2/26/2025  Narrative: EXAM: CT ANGIOGRAM ABDOMEN PELVIS- - 2/26/2025 7:15 PM  HISTORY: venous phasing for dvt- ordered by vascular; I82.4Y2-Acute embolism and thrombosis of unspecified deep veins of left proximal lower extremity   COMPARISON: None available.  Automatic exposure control was utilized to make radiation dose as low as reasonably achievable.  TECHNIQUE: Enhanced axial images of the abdomen and pelvis obtained with multiplanar reformats. 3D postprocessing, including MIPs, performed and images saved to PACS.  FINDINGS: VISUALIZED CHEST: No pericardial or pleural effusion is seen. Lung bases are grossly clear.  LIVER/BILIARY: Hepatic parenchyma is unremarkable. Patient is status post cholecystectomy. Bile ducts are unremarkable.  KIDNEYS/URETERS: Bilateral kidneys and ureters are unremarkable. There is no hydronephrosis.  ADRENAL: Unremarkable.  SPLEEN: Unremarkable.  PANCREAS: Unremarkable.   PERITONEUM/RETROPERITONEUM: No free air, ascites, or lymphadenopathy.  GI TRACT: There is no bowel obstruction or mass.  PELVIS: Uterus and adnexa are unremarkable. Urinary bladder is incompletely distended. No free fluid or lymphadenopathy is seen.  SOFT TISSUES: There is edema in the left inguinal soft tissues and subcutaneous soft tissues of the proximal left lower extremity.  BONES: No acute or aggressive bony lesion.    VESSELS:  AORTA/ABDOMINAL-PELVIC VESSELS: Abdominal aorta is unremarkable. No aneurysm, stenosis, or occlusion is seen. Bilateral common iliac arteries and bilateral internal and external iliac arteries are patent without stenosis or occlusion.  There is enlargement of the proximal left external iliac vein with filling defects consistent with deep venous thrombosis extends anteriorly to the left common femoral vein and left superficial femoral vein.         Impression: 1. Deep venous thrombosis on the left involving the external iliac vein, common femoral vein, and superficial femoral vein and associated edema in the subcutaneous soft tissues of the proximal left lower extremity and left inguinal soft tissues.  This report was signed and finalized on 2/26/2025 8:45 PM by Giovanni Hernandez.      CT Angiogram Chest  Result Date: 2/26/2025  Narrative: EXAM: CT ANGIOGRAM CHEST-  DATE: 2/26/2025 7:15 PM  HISTORY: Lower extremity DVT  COMPARISON: None available.  Automatic exposure control was utilized to make radiation dose as low as reasonably achievable.  TECHNIQUE: Enhanced CT images of the chest obtained with multiplanar reformats.  FINDINGS:  MEDIASTINUM/EXTRATHORACIC:   Thoracic aorta is unremarkable. There is no significant coronary artery calcification. No pericardial or pleural effusion is identified. No thoracic lymphadenopathy is seen.  PULMONARY ARTERIES: Pulmonary arteries are opacified and no filling defects are seen.  LUNGS/AIRWAYS: There is mild scarring at the superior segment of the left lower lobe. No pneumonia is seen. Airways are unremarkable.   INCLUDED UPPER ABDOMEN: The visualized portions of the upper abdomen are within normal limits. There is a small hiatal hernia.  SOFT TISSUES: Submitted soft tissues of the chest are unremarkable.  BONES: No suspicious osseous lesions identified.      Impression: 1. No pulmonary embolism identified or active disease in the chest.  This report was signed and finalized on 2/26/2025 8:41 PM by Giovanni Hernandez.        Patient Active Problem List   Diagnosis   • Acute deep vein thrombosis (DVT) of left iliofemoral vein         ICD-10-CM ICD-9-CM   1. Acute deep vein thrombosis (DVT) of left iliofemoral vein  I82.422 453.41   2. Primary hypertension  I10 401.9           PLAN: After thoroughly evaluating Angie Evans, I believe the best course of action is to remain conservative from vascular surgery standpoint.   She did have noninvasive testing performed today and there is recurrent DVT from her femoral vein down the leg.  Bilateral common femoral veins without DVT.  Since last week she does tell me that she is improving and leg is feeling much better.  I did explain while there is a clot in the vein there is not anything that we have to do about this at this time.  I asked her to watch for signs of worsening swelling pain as she had previous to surgery.  At this point she does continue to move in a positive direction.  If things change have asked her to contact me sooner otherwise we will plan to see her back in 6 months with a repeat venous duplex.  I would like her to continue wearing compression stockings and keep her legs elevated when she is not on them.  She should continue her Eliquis 5 mg twice daily which will need to be further prescribed by her PCP.   Body mass index is 41.88 kg/m².  This was all discussed in full with complete understanding.  Thank you for allowing me to participate in the care of your patient.  Please do not hesitate to call with any questions or concerns.  We will keep you aware of any further encounters with Angie Evans.      Sincerely Yours,      EVER Ham

## 2025-03-17 ENCOUNTER — OFFICE VISIT (OUTPATIENT)
Dept: VASCULAR SURGERY | Facility: CLINIC | Age: 50
End: 2025-03-17
Payer: COMMERCIAL

## 2025-03-17 VITALS
OXYGEN SATURATION: 100 % | SYSTOLIC BLOOD PRESSURE: 132 MMHG | BODY MASS INDEX: 41.83 KG/M2 | HEIGHT: 64 IN | DIASTOLIC BLOOD PRESSURE: 100 MMHG | WEIGHT: 245 LBS | HEART RATE: 94 BPM | RESPIRATION RATE: 20 BRPM

## 2025-03-17 DIAGNOSIS — I82.422 ACUTE DEEP VEIN THROMBOSIS (DVT) OF LEFT ILIOFEMORAL VEIN: Primary | ICD-10-CM

## 2025-03-17 DIAGNOSIS — I10 PRIMARY HYPERTENSION: ICD-10-CM

## 2025-03-17 PROCEDURE — 99213 OFFICE O/P EST LOW 20 MIN: CPT | Performed by: NURSE PRACTITIONER

## 2025-03-17 NOTE — LETTER
April 3, 2025     EVER Casey  [address] [facility]  [place] [address]    Patient: Angie Evans   YOB: 1975   Date of Visit: 3/17/2025     Dear EVER Casey:       Thank you for referring Angie Evans to me for evaluation. Below are the relevant portions of my assessment and plan of care.    If you have questions, please do not hesitate to call me. I look forward to following Angie along with you.         Sincerely,        EVER Ham        CC: No Recipients    Lida Cartwright APRN  04/03/25 0954  Sign when Signing Visit  03/17/2025       Erendira Finney APRN  [address] [facility]  [place] [address]        Angie Evans  1975    Chief Complaint   Patient presents with   • Follow-up     US completed       Dear Erendira Finney APRN:    HPI     I had the pleasure of seeing your patient in the office today for follow up.  As you recall, the patient is a 50 y.o. female who we recently saw while hospitalized after she presented to the emergency department with acute onset of left lower extremity swelling, pain, and redness.  She did have recent immobilization due to injury in her foot and also has a sedentary job as well as travels an hour to and from work.  She had testing in the emergency department revealing a left lower extremity iliofemoral DVT.  She did undergo mechanical thrombectomy of the left lower extremity and coil embolization of a superficial femoral vein sidebranch on 2/27/2025.  She does continue to have swelling to her thigh however this is much improved since last week.  She is maintained on Eliquis.  She is also wearing compression stockings.  She had noninvasive testing showing recurrent DVT.  Today she reports her leg is feeling much better.    Review of Systems   Constitutional: Negative.    HENT: Negative.     Eyes: Negative.    Respiratory: Negative.     Cardiovascular:  Positive for leg swelling.   Gastrointestinal: Negative.   "  Endocrine: Negative.    Genitourinary: Negative.    Musculoskeletal: Negative.    Skin: Negative.    Allergic/Immunologic: Negative.    Neurological: Negative.    Hematological: Negative.    Psychiatric/Behavioral:  The patient is nervous/anxious.    All other systems reviewed and are negative.      /100 (BP Location: Right arm, Patient Position: Sitting, Cuff Size: Adult)   Pulse 94   Resp 20   Ht 162.6 cm (64.02\")   Wt 111 kg (245 lb)   SpO2 100%   BMI 42.03 kg/m²   Physical Exam  Vitals and nursing note reviewed.   Constitutional:       General: She is not in acute distress.     Appearance: Normal appearance. She is well-developed and well-groomed. She is morbidly obese. She is not diaphoretic.   HENT:      Head: Normocephalic and atraumatic.   Eyes:      General: No scleral icterus.     Pupils: Pupils are equal, round, and reactive to light.   Neck:      Thyroid: No thyromegaly.      Vascular: No carotid bruit or JVD.   Cardiovascular:      Rate and Rhythm: Normal rate and regular rhythm.      Pulses: Normal pulses.      Heart sounds: Normal heart sounds and S2 normal. No murmur heard.     No friction rub. No gallop.   Pulmonary:      Effort: Pulmonary effort is normal.      Breath sounds: Normal breath sounds.   Abdominal:      General: Bowel sounds are normal.      Palpations: Abdomen is soft.   Musculoskeletal:         General: Normal range of motion.      Cervical back: Normal range of motion and neck supple.      Left lower leg: Edema present.   Skin:     General: Skin is warm and dry.   Neurological:      General: No focal deficit present.      Mental Status: She is alert and oriented to person, place, and time.      Cranial Nerves: No cranial nerve deficit.   Psychiatric:         Mood and Affect: Mood normal.         Behavior: Behavior normal. Behavior is cooperative.         Thought Content: Thought content normal.         Judgment: Judgment normal.         DIAGNOSTIC DATA:    US Venous " Doppler Lower Extremity Left (duplex)  Result Date: 3/14/2025  Narrative: History: Swelling with history of DVT      Impression: Impression: There is extensive DVT from the proximal superficial vein extending into the tibial veins. Bilateral common femoral veins are patent.  Comments: Left lower extremity venous duplex exam was performed using color Doppler flow, Doppler wave form analysis, and grayscale imaging, with and without compression. There is evidence of DVT in the proximal superficial vein that extends into the tibial veins in the left lower extremity. The bilateral common femoral veins exhibit color flow and are compressible.. There is no thrombus identified in the saphenofemoral junction or the greater saphenous vein.   This report was signed and finalized on 3/14/2025 8:29 PM by Arthur Rose.        Patient Active Problem List   Diagnosis   • Acute deep vein thrombosis (DVT) of left iliofemoral vein         ICD-10-CM ICD-9-CM   1. Acute deep vein thrombosis (DVT) of left iliofemoral vein  I82.422 453.41   2. Primary hypertension  I10 401.9             PLAN: After thoroughly evaluating Angie Evans, I believe the best course of action is to remain conservative from vascular surgery standpoint.  Currently she is doing well and reports her leg is feeling better from even being seen last week.  Given she is continuing to improve we will not need any repeat intervention.  We will plan on seeing her back in 6 months with a repeat venous duplex.  I did recommend she continue to wear compression stockings and keep her legs elevated when she is not on them.  She should continue her Eliquis 5 mg twice daily which we will have her primary care provider continue to prescribe.   Body mass index is 42.03 kg/m².  This was all discussed in full with complete understanding.  Thank you for allowing me to participate in the care of your patient.  Please do not hesitate to call with any questions or concerns.  We will  keep you aware of any further encounters with Angie Evans.      Sincerely Yours,      EVER Ham

## 2025-03-27 NOTE — PROGRESS NOTES
"03/14/2025       Erendira Finney, APRN  [address] [place] [address]        Angie Evans  1975    Chief Complaint   Patient presents with    Post-op     DVT THROMBOLYSIS. Two week post op.        Dear Erendira Finney, EVER:    HPI     I had the pleasure of seeing your patient in the office today for follow up.  As you recall, the patient is a 50 y.o. female who we recently saw while hospitalized after she presented to the emergency department with acute onset of left lower extremity swelling, pain, and redness.  She did have recent immobilization due to injury in her foot and also has a sedentary job as well as travels an hour to and from work.  She had testing in the emergency department revealing a left lower extremity iliofemoral DVT.  She did undergo mechanical thrombectomy of the left lower extremity and coil embolization of a superficial femoral vein sidebranch on 2/27/2025.  She does continue to have swelling to her thigh however this is much improved since last week.  She is maintained on Eliquis.  She is also wearing compression stockings.  She did have noninvasive testing performed today, which I did review in office.      Review of Systems   Constitutional: Negative.    HENT: Negative.     Eyes: Negative.    Respiratory: Negative.     Cardiovascular:  Positive for leg swelling.   Gastrointestinal: Negative.    Endocrine: Negative.    Genitourinary: Negative.    Musculoskeletal: Negative.    Skin: Negative.    Allergic/Immunologic: Negative.    Neurological: Negative.    Hematological: Negative.    Psychiatric/Behavioral:  The patient is nervous/anxious.    All other systems reviewed and are negative.      /90   Resp 18   Ht 162.6 cm (64\")   Wt 111 kg (244 lb)   SpO2 98%   BMI 41.88 kg/m²   Physical Exam  Vitals and nursing note reviewed.   Constitutional:       General: She is not in acute distress.     Appearance: Normal appearance. She is well-developed and well-groomed. " She is morbidly obese. She is not diaphoretic.   HENT:      Head: Normocephalic and atraumatic.   Eyes:      General: No scleral icterus.     Pupils: Pupils are equal, round, and reactive to light.   Neck:      Thyroid: No thyromegaly.      Vascular: No carotid bruit or JVD.   Cardiovascular:      Rate and Rhythm: Normal rate and regular rhythm.      Pulses: Normal pulses.      Heart sounds: Normal heart sounds and S2 normal. No murmur heard.     No friction rub. No gallop.   Pulmonary:      Effort: Pulmonary effort is normal.      Breath sounds: Normal breath sounds.   Abdominal:      General: Bowel sounds are normal.      Palpations: Abdomen is soft.   Musculoskeletal:         General: Normal range of motion.      Cervical back: Normal range of motion and neck supple.      Left lower leg: Edema present.   Skin:     General: Skin is warm and dry.   Neurological:      General: No focal deficit present.      Mental Status: She is alert and oriented to person, place, and time.      Cranial Nerves: No cranial nerve deficit.   Psychiatric:         Mood and Affect: Mood normal.         Behavior: Behavior normal. Behavior is cooperative.         Thought Content: Thought content normal.         Judgment: Judgment normal.         DIAGNOSTIC DATA:    US Venous Doppler Lower Extremity Left (duplex)  Result Date: 3/14/2025  Narrative: History: Swelling with history of DVT      Impression: Impression: There is extensive DVT from the proximal superficial vein extending into the tibial veins. Bilateral common femoral veins are patent.  Comments: Left lower extremity venous duplex exam was performed using color Doppler flow, Doppler wave form analysis, and grayscale imaging, with and without compression. There is evidence of DVT in the proximal superficial vein that extends into the tibial veins in the left lower extremity. The bilateral common femoral veins exhibit color flow and are compressible.. There is no thrombus identified  in the saphenofemoral junction or the greater saphenous vein.   This report was signed and finalized on 3/14/2025 8:29 PM by Arthur Rose.      US Guided Vascular Access  Result Date: 3/3/2025  Narrative: HISTORY: Left lower extremity DVT.  Comments: Grayscale imaging as well as color flow duplex were used to evaluate percutaneous cannulation of the left popliteal vein.  Under ultrasound guidance, and using a micropuncture technique, the left popliteal vein was successfully cannulated.      Impression: Successful ultrasound-guided cannulation of the left popliteal vein.  This report was signed and finalized on 3/3/2025 3:33 PM by Dr. Eleazar Rubio MD.      US Venous Doppler Lower Extremity Left (duplex)  Result Date: 2/27/2025  Narrative: History: Swelling      Impression: Impression: DVT noted in the left common femoral vein, saphenofemoral junction, superficial femoral vein, popliteal vein, and tibial veins.  Comments: Left lower extremity venous duplex exam was performed using color Doppler flow, Doppler wave form analysis, and grayscale imaging, with and without compression. There is occlusive thrombus of the left common femoral vein. This extends down through the popliteal vein and into the tibial veins.   This report was signed and finalized on 2/27/2025 5:02 PM by Arthur Rose.      FL C Arm During Surgery  Result Date: 2/27/2025  Narrative: This procedure was auto-finalized with no dictation required.    CT Angiogram Abdomen Pelvis  Result Date: 2/26/2025  Narrative: EXAM: CT ANGIOGRAM ABDOMEN PELVIS- - 2/26/2025 7:15 PM  HISTORY: venous phasing for dvt- ordered by vascular; I82.4Y2-Acute embolism and thrombosis of unspecified deep veins of left proximal lower extremity   COMPARISON: None available.  Automatic exposure control was utilized to make radiation dose as low as reasonably achievable.  TECHNIQUE: Enhanced axial images of the abdomen and pelvis obtained with multiplanar reformats. 3D  postprocessing, including MIPs, performed and images saved to PACS.  FINDINGS: VISUALIZED CHEST: No pericardial or pleural effusion is seen. Lung bases are grossly clear.  LIVER/BILIARY: Hepatic parenchyma is unremarkable. Patient is status post cholecystectomy. Bile ducts are unremarkable.  KIDNEYS/URETERS: Bilateral kidneys and ureters are unremarkable. There is no hydronephrosis.  ADRENAL: Unremarkable.  SPLEEN: Unremarkable.  PANCREAS: Unremarkable.   PERITONEUM/RETROPERITONEUM: No free air, ascites, or lymphadenopathy.  GI TRACT: There is no bowel obstruction or mass.  PELVIS: Uterus and adnexa are unremarkable. Urinary bladder is incompletely distended. No free fluid or lymphadenopathy is seen.  SOFT TISSUES: There is edema in the left inguinal soft tissues and subcutaneous soft tissues of the proximal left lower extremity.  BONES: No acute or aggressive bony lesion.    VESSELS:  AORTA/ABDOMINAL-PELVIC VESSELS: Abdominal aorta is unremarkable. No aneurysm, stenosis, or occlusion is seen. Bilateral common iliac arteries and bilateral internal and external iliac arteries are patent without stenosis or occlusion.  There is enlargement of the proximal left external iliac vein with filling defects consistent with deep venous thrombosis extends anteriorly to the left common femoral vein and left superficial femoral vein.        Impression: 1. Deep venous thrombosis on the left involving the external iliac vein, common femoral vein, and superficial femoral vein and associated edema in the subcutaneous soft tissues of the proximal left lower extremity and left inguinal soft tissues.  This report was signed and finalized on 2/26/2025 8:45 PM by Giovanni Hernandez.      CT Angiogram Chest  Result Date: 2/26/2025  Narrative: EXAM: CT ANGIOGRAM CHEST-  DATE: 2/26/2025 7:15 PM  HISTORY: Lower extremity DVT  COMPARISON: None available.  Automatic exposure control was utilized to make radiation dose as low as reasonably  achievable.  TECHNIQUE: Enhanced CT images of the chest obtained with multiplanar reformats.  FINDINGS:  MEDIASTINUM/EXTRATHORACIC:   Thoracic aorta is unremarkable. There is no significant coronary artery calcification. No pericardial or pleural effusion is identified. No thoracic lymphadenopathy is seen.  PULMONARY ARTERIES: Pulmonary arteries are opacified and no filling defects are seen.  LUNGS/AIRWAYS: There is mild scarring at the superior segment of the left lower lobe. No pneumonia is seen. Airways are unremarkable.   INCLUDED UPPER ABDOMEN: The visualized portions of the upper abdomen are within normal limits. There is a small hiatal hernia.  SOFT TISSUES: Submitted soft tissues of the chest are unremarkable.  BONES: No suspicious osseous lesions identified.      Impression: 1. No pulmonary embolism identified or active disease in the chest.  This report was signed and finalized on 2/26/2025 8:41 PM by Giovanni Hernandez.        Patient Active Problem List   Diagnosis    Acute deep vein thrombosis (DVT) of left iliofemoral vein         ICD-10-CM ICD-9-CM   1. Acute deep vein thrombosis (DVT) of left iliofemoral vein  I82.422 453.41   2. Primary hypertension  I10 401.9           PLAN: After thoroughly evaluating Angie Evans, I believe the best course of action is to remain conservative from vascular surgery standpoint.  She did have noninvasive testing performed today and there is recurrent DVT from her femoral vein down the leg.  Bilateral common femoral veins without DVT.  Since last week she does tell me that she is improving and leg is feeling much better.  I did explain while there is a clot in the vein there is not anything that we have to do about this at this time.  I asked her to watch for signs of worsening swelling pain as she had previous to surgery.  At this point she does continue to move in a positive direction.  If things change have asked her to contact me sooner otherwise we will plan to  see her back in 6 months with a repeat venous duplex.  I would like her to continue wearing compression stockings and keep her legs elevated when she is not on them.  She should continue her Eliquis 5 mg twice daily which will need to be further prescribed by her PCP.   Body mass index is 41.88 kg/m².  This was all discussed in full with complete understanding.  Thank you for allowing me to participate in the care of your patient.  Please do not hesitate to call with any questions or concerns.  We will keep you aware of any further encounters with Angie Evans.      Sincerely Yours,      EVER Ham

## 2025-04-03 NOTE — PROGRESS NOTES
"03/17/2025       Erendira Finney, EVER  [address] [place] [address]        Angie Evans  1975    Chief Complaint   Patient presents with    Follow-up     US completed       Dear Erendira Finney APRN:    HPI     I had the pleasure of seeing your patient in the office today for follow up.  As you recall, the patient is a 50 y.o. female who we recently saw while hospitalized after she presented to the emergency department with acute onset of left lower extremity swelling, pain, and redness.  She did have recent immobilization due to injury in her foot and also has a sedentary job as well as travels an hour to and from work.  She had testing in the emergency department revealing a left lower extremity iliofemoral DVT.  She did undergo mechanical thrombectomy of the left lower extremity and coil embolization of a superficial femoral vein sidebranch on 2/27/2025.  She does continue to have swelling to her thigh however this is much improved since last week.  She is maintained on Eliquis.  She is also wearing compression stockings.  She had noninvasive testing showing recurrent DVT.  Today she reports her leg is feeling much better.    Review of Systems   Constitutional: Negative.    HENT: Negative.     Eyes: Negative.    Respiratory: Negative.     Cardiovascular:  Positive for leg swelling.   Gastrointestinal: Negative.    Endocrine: Negative.    Genitourinary: Negative.    Musculoskeletal: Negative.    Skin: Negative.    Allergic/Immunologic: Negative.    Neurological: Negative.    Hematological: Negative.    Psychiatric/Behavioral:  The patient is nervous/anxious.    All other systems reviewed and are negative.      /100 (BP Location: Right arm, Patient Position: Sitting, Cuff Size: Adult)   Pulse 94   Resp 20   Ht 162.6 cm (64.02\")   Wt 111 kg (245 lb)   SpO2 100%   BMI 42.03 kg/m²   Physical Exam  Vitals and nursing note reviewed.   Constitutional:       General: She is not in acute " distress.     Appearance: Normal appearance. She is well-developed and well-groomed. She is morbidly obese. She is not diaphoretic.   HENT:      Head: Normocephalic and atraumatic.   Eyes:      General: No scleral icterus.     Pupils: Pupils are equal, round, and reactive to light.   Neck:      Thyroid: No thyromegaly.      Vascular: No carotid bruit or JVD.   Cardiovascular:      Rate and Rhythm: Normal rate and regular rhythm.      Pulses: Normal pulses.      Heart sounds: Normal heart sounds and S2 normal. No murmur heard.     No friction rub. No gallop.   Pulmonary:      Effort: Pulmonary effort is normal.      Breath sounds: Normal breath sounds.   Abdominal:      General: Bowel sounds are normal.      Palpations: Abdomen is soft.   Musculoskeletal:         General: Normal range of motion.      Cervical back: Normal range of motion and neck supple.      Left lower leg: Edema present.   Skin:     General: Skin is warm and dry.   Neurological:      General: No focal deficit present.      Mental Status: She is alert and oriented to person, place, and time.      Cranial Nerves: No cranial nerve deficit.   Psychiatric:         Mood and Affect: Mood normal.         Behavior: Behavior normal. Behavior is cooperative.         Thought Content: Thought content normal.         Judgment: Judgment normal.         DIAGNOSTIC DATA:    US Venous Doppler Lower Extremity Left (duplex)  Result Date: 3/14/2025  Narrative: History: Swelling with history of DVT      Impression: Impression: There is extensive DVT from the proximal superficial vein extending into the tibial veins. Bilateral common femoral veins are patent.  Comments: Left lower extremity venous duplex exam was performed using color Doppler flow, Doppler wave form analysis, and grayscale imaging, with and without compression. There is evidence of DVT in the proximal superficial vein that extends into the tibial veins in the left lower extremity. The bilateral common  femoral veins exhibit color flow and are compressible.. There is no thrombus identified in the saphenofemoral junction or the greater saphenous vein.   This report was signed and finalized on 3/14/2025 8:29 PM by Arthur Rose.        Patient Active Problem List   Diagnosis    Acute deep vein thrombosis (DVT) of left iliofemoral vein         ICD-10-CM ICD-9-CM   1. Acute deep vein thrombosis (DVT) of left iliofemoral vein  I82.422 453.41   2. Primary hypertension  I10 401.9             PLAN: After thoroughly evaluating Angie Evans, I believe the best course of action is to remain conservative from vascular surgery standpoint.  Currently she is doing well and reports her leg is feeling better from even being seen last week.  Given she is continuing to improve we will not need any repeat intervention.  We will plan on seeing her back in 6 months with a repeat venous duplex.  I did recommend she continue to wear compression stockings and keep her legs elevated when she is not on them.  She should continue her Eliquis 5 mg twice daily which we will have her primary care provider continue to prescribe.   Body mass index is 42.03 kg/m².  This was all discussed in full with complete understanding.  Thank you for allowing me to participate in the care of your patient.  Please do not hesitate to call with any questions or concerns.  We will keep you aware of any further encounters with Angie Evans.      Sincerely Yours,      EVER Ham

## (undated) DEVICE — RADIFOCUS GLIDEWIRE ADVANTAGE GUIDEWIRE: Brand: GLIDEWIRE ADVANTAGE

## (undated) DEVICE — PROXIMATE RH ROTATING HEAD SKIN STAPLERS (35 WIDE) CONTAINS 35 STAINLESS STEEL STAPLES: Brand: PROXIMATE

## (undated) DEVICE — PACK,UNIVERSAL,NO GOWNS: Brand: MEDLINE

## (undated) DEVICE — ST TB EXT STANDARDBORE 30IN

## (undated) DEVICE — RADIFOCUS GLIDECATH: Brand: GLIDECATH

## (undated) DEVICE — STPCK 4WY ON/OFF VLV M/COLAR FIT 45PSI STRL

## (undated) DEVICE — BALN EVERCROSS OTW .035 6F 8X40 80

## (undated) DEVICE — 1LYRTR 16FR10ML100%SIL UMS SNP: Brand: MEDLINE INDUSTRIES, INC.

## (undated) DEVICE — DRSNG SURESITE WNDW 4X4.5

## (undated) DEVICE — STERILE (14X122CM) TELESCOPICALLY-FOLDED COVER: Brand: CIV-CLEAR™ TRANSDUCER COVER

## (undated) DEVICE — Device

## (undated) DEVICE — PAD ENDOVASCULAR: Brand: MEDLINE INDUSTRIES, INC.

## (undated) DEVICE — ST MIC/INTRO ACC SHRP/NDL TUNG/TP NITNL 5F 45CM 7CM

## (undated) DEVICE — INTENDED FOR TISSUE SEPARATION, AND OTHER PROCEDURES THAT REQUIRE A SHARP SURGICAL BLADE TO PUNCTURE OR CUT.: Brand: BARD-PARKER ®  SAFETY SCALPED

## (undated) DEVICE — APPL CHLORAPREP HI/LITE 26ML ORNG

## (undated) DEVICE — GLV SURG SENSICARE W/ALOE PF LF 7.5 STRL

## (undated) DEVICE — CANSTR COL ENGINE FOR INDIGO SYS

## (undated) DEVICE — PINNACLE R/O II INTRODUCER SHEATH WITH RADIOPAQUE MARKER: Brand: PINNACLE

## (undated) DEVICE — SUT ETHILON 3/0 30IN BLK

## (undated) DEVICE — SYR LL TP 10ML STRL

## (undated) DEVICE — IMMOB KN 3PNL DLX CANVS 19IN BLU

## (undated) DEVICE — INFLATION DEVICE: Brand: ENCORE™ 26